# Patient Record
Sex: MALE | Race: WHITE | Employment: FULL TIME | URBAN - METROPOLITAN AREA
[De-identification: names, ages, dates, MRNs, and addresses within clinical notes are randomized per-mention and may not be internally consistent; named-entity substitution may affect disease eponyms.]

---

## 2018-02-05 ENCOUNTER — OFFICE VISIT (OUTPATIENT)
Dept: FAMILY MEDICINE CLINIC | Age: 33
End: 2018-02-05

## 2018-02-05 VITALS
TEMPERATURE: 98.2 F | RESPIRATION RATE: 16 BRPM | HEIGHT: 68 IN | WEIGHT: 188.5 LBS | OXYGEN SATURATION: 98 % | SYSTOLIC BLOOD PRESSURE: 110 MMHG | DIASTOLIC BLOOD PRESSURE: 72 MMHG | BODY MASS INDEX: 28.57 KG/M2 | HEART RATE: 56 BPM

## 2018-02-05 DIAGNOSIS — G47.00 INSOMNIA, UNSPECIFIED TYPE: ICD-10-CM

## 2018-02-05 DIAGNOSIS — M54.41 BILATERAL LOW BACK PAIN WITH BILATERAL SCIATICA, UNSPECIFIED CHRONICITY: ICD-10-CM

## 2018-02-05 DIAGNOSIS — G43.109 MIGRAINE WITH AURA AND WITHOUT STATUS MIGRAINOSUS, NOT INTRACTABLE: Primary | ICD-10-CM

## 2018-02-05 DIAGNOSIS — M54.42 BILATERAL LOW BACK PAIN WITH BILATERAL SCIATICA, UNSPECIFIED CHRONICITY: ICD-10-CM

## 2018-02-05 DIAGNOSIS — M54.2 CERVICAL SPINE PAIN: ICD-10-CM

## 2018-02-05 RX ORDER — TRAZODONE HYDROCHLORIDE 100 MG/1
100 TABLET ORAL
Qty: 30 TAB | Refills: 5 | Status: SHIPPED | OUTPATIENT
Start: 2018-02-05

## 2018-02-05 RX ORDER — PREGABALIN 50 MG/1
50 CAPSULE ORAL 2 TIMES DAILY
Qty: 60 CAP | Refills: 1 | Status: SHIPPED | OUTPATIENT
Start: 2018-02-05

## 2018-02-05 RX ORDER — RIZATRIPTAN BENZOATE 10 MG/1
10 TABLET, ORALLY DISINTEGRATING ORAL
Qty: 12 TAB | Refills: 5 | Status: SHIPPED | OUTPATIENT
Start: 2018-02-05 | End: 2019-11-24 | Stop reason: SDUPTHER

## 2018-02-05 RX ORDER — IBUPROFEN 200 MG
800 TABLET ORAL
COMMUNITY
End: 2018-03-02 | Stop reason: ALTCHOICE
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
## 2018-02-05 NOTE — MR AVS SNAPSHOT
[address] [address] 
[phone] Patient: Vesta [place] MRN: [id number] HAH:[birth year] Visit Information Date & Time Provider Department Dept. Phone Encounter #  
 2/5/2018  2:30 PM Orlin Alvarez NP [facility] [phone] [id number] Upcoming Health Maintenance Date Due DTaP/Tdap/Td series (1 - Tdap) 12/4/2005 Influenza Age 5 to Adult 8/1/2017 Allergies as of 2/5/2018  Review Complete On: 2/5/2018 By: Orlin Alvarez NP Severity Noted Reaction Type Reactions Shellfish Derived High 02/05/2018    Anaphylaxis Tobramycin  02/05/2018    Other (comments) Eye swelling Current Immunizations  Never Reviewed No immunizations on file. Not reviewed this visit You Were Diagnosed With   
  
 Codes Comments Migraine with aura and without status migrainosus, not intractable    -  Primary ICD-10-CM: G43.109 ICD-9-CM: 346.00 Bilateral low back pain with bilateral sciatica, unspecified chronicity     ICD-10-CM: M54.42, M54.41 
ICD-9-CM: 724.3 Cervical spine pain     ICD-10-CM: M54.2 ICD-9-CM: 723.1 Insomnia, unspecified type     ICD-10-CM: G47.00 ICD-9-CM: 780.52 Vitals BP Pulse Temp Resp Height(growth percentile) Weight(growth percentile) 110/72 (!) 56 98.2 °F (36.8 °C) (Oral) 16 5' 8\" (1.727 m) 188 lb 8 oz (85.5 kg) SpO2 BMI Smoking Status 98% 28.66 kg/m2 Former Smoker Vitals History BMI and BSA Data Body Mass Index Body Surface Area  
 28.66 kg/m 2 2.03 m 2 Preferred Pharmacy Pharmacy Name Phone [facility] [address], [address] [phone] Your Updated Medication List  
  
   
This list is accurate as of: 2/5/18  3:36 PM.  Always use your most recent med list. ADVIL 200 mg tablet Generic drug:  ibuprofen Take 800 mg by mouth every six (6) hours as needed for Pain. pregabalin 50 mg capsule Commonly known as:  Chio Sor Take 1 Cap by mouth two (2) times a day. rizatriptan 10 mg disintegrating tablet Commonly known as:  MAXALT-MLT Take 1 Tab by mouth once as needed for Migraine for up to 1 dose. traZODone 100 mg tablet Commonly known as:  Sherrlyn Pali Take 1 Tab by mouth nightly. Prescriptions Printed Refills  
 pregabalin (LYRICA) 50 mg capsule 1 Sig: Take 1 Cap by mouth two (2) times a day. Class: Print Route: Oral  
  
Prescriptions Sent to Pharmacy Refills  
 rizatriptan (MAXALT-MLT) 10 mg disintegrating tablet 5 Sig: Take 1 Tab by mouth once as needed for Migraine for up to 1 dose. Class: Normal  
 Pharmacy: [address], [address] Ph #: [phone] Route: Oral  
 traZODone (DESYREL) 100 mg tablet 5 Sig: Take 1 Tab by mouth nightly. Class: Normal  
 Pharmacy: [address], [address] Ph #: [phone] Route: Oral  
  
We Performed the Following REFERRAL TO PAIN MANAGEMENT [RFI327 Custom] Referral Information Referral ID Referred By Referred To  
  
 [id number] [address] Phone: [phone] Fax: [phone] Visits Status Start Date End Date 1 New Request 2/5/18 2/5/19 If your referral has a status of pending review or denied, additional information will be sent to support the outcome of this decision. Introducing [facility] & HEALTH SERVICES! Glory Torres introduces RoboCV patient portal. Now you can access parts of your medical record, email your doctor's office, and request medication refills online. 1. In your internet browser, go to [email or web address] 2. Click on the First Time User? Click Here link in the Sign In box. You will see the New Member Sign Up page. 3. Enter your Strategy Store Access Code exactly as it appears below. You will not need to use this code after youve completed the sign-up process. If you do not sign up before the expiration date, you must request a new code. · Strategy Store Access Code: [email or web address] Expires: 5/6/2018  2:47 PM 
 
4. Enter the last four digits of your Social Security Number (xxxx) and Date of Birth (mm/dd/yyyy) as indicated and click Submit. You will be taken to the next sign-up page. 5. Create a Strategy Store ID. This will be your Strategy Store login ID and cannot be changed, so think of one that is secure and easy to remember. 6. Create a Strategy Store password. You can change your password at any time. 7. Enter your Password Reset Question and Answer. This can be used at a later time if you forget your password. 8. Enter your e-mail address. You will receive e-mail notification when new information is available in [address] 9. Click Sign Up. You can now view and download portions of your medical record. 10. Click the Download Summary menu link to download a portable copy of your medical information. If you have questions, please visit the Frequently Asked Questions section of the Strategy Store website. Remember, Strategy Store is NOT to be used for urgent needs. For medical emergencies, dial 911. Now available from your iPhone and Android! Please provide this summary of care documentation to your next provider. If you have any questions after today's visit, please call [phone].

## 2018-02-05 NOTE — PROGRESS NOTES
1. Have you been to the ER, urgent care clinic since your last visit? Hospitalized since your last visit? No    2. Have you seen or consulted any other health care providers outside of the [address] [place] [address] since your last visit? Include any pap smears or colon screening. No    Chief Complaint   Patient presents with    Establish Care    Allergies     would like to have allergy testing    Migraine     needs meds    Other     referral to pain management     Pt states he would like allergy testing & a referral to pain management. He also needs refills on migraine meds.

## 2018-02-16 ENCOUNTER — DOCUMENTATION ONLY (OUTPATIENT)
Dept: FAMILY MEDICINE CLINIC | Age: 33
End: 2018-02-16

## 2018-02-16 NOTE — PROGRESS NOTES
Per patient's request I faxed referral requisition to Dr. Robb Turpin [phone]. Confirmation rec'd.

## 2018-02-19 ENCOUNTER — DOCUMENTATION ONLY (OUTPATIENT)
Dept: FAMILY MEDICINE CLINIC | Age: 33
End: 2018-02-19

## 2018-02-19 NOTE — PROGRESS NOTES
Faxed 02/05/18 office note to Dr Leopoldo Manners 's office @[phone].  Fax confirmation received

## 2018-03-02 ENCOUNTER — OFFICE VISIT (OUTPATIENT)
Dept: FAMILY MEDICINE CLINIC | Age: 33
End: 2018-03-02

## 2018-03-02 VITALS
DIASTOLIC BLOOD PRESSURE: 62 MMHG | RESPIRATION RATE: 18 BRPM | WEIGHT: 185 LBS | HEIGHT: 68 IN | OXYGEN SATURATION: 97 % | BODY MASS INDEX: 28.04 KG/M2 | HEART RATE: 87 BPM | TEMPERATURE: 98.3 F | SYSTOLIC BLOOD PRESSURE: 133 MMHG

## 2018-03-02 DIAGNOSIS — M1A.0720 CHRONIC GOUT OF LEFT FOOT, UNSPECIFIED CAUSE: Primary | ICD-10-CM

## 2018-03-02 RX ORDER — INDOMETHACIN 50 MG/1
50 CAPSULE ORAL
Qty: 60 CAP | Refills: 2 | Status: SHIPPED | OUTPATIENT
Start: 2018-03-02 | End: 2018-08-25 | Stop reason: SDUPTHER

## 2018-03-02 RX ORDER — HYDROCODONE BITARTRATE AND ACETAMINOPHEN 5; 325 MG/1; MG/1
TABLET ORAL
COMMUNITY
End: 2018-07-25

## 2018-03-02 RX ORDER — VENLAFAXINE HYDROCHLORIDE 75 MG/1
CAPSULE, EXTENDED RELEASE ORAL DAILY
COMMUNITY
End: 2018-06-26

## 2018-03-02 NOTE — PROGRESS NOTES
Chief Complaint   Patient presents with    Medication Evaluation    Gout     Patient in office today to discuss f/u on medications. Pt has est care with pain management. Pt would like to discuss gout flare ups. Pt states gout flare is not active in the office. 1. Have you been to the ER, urgent care clinic since your last visit? Hospitalized since your last visit? No    2. Have you seen or consulted any other health care providers outside of the [address] [place] [address] since your last visit? Include any pap smears or colon screening.  No

## 2018-03-02 NOTE — PROGRESS NOTES
HISTORY OF PRESENT ILLNESS  Carmen Nowak is a 34 y.o. male. HPI  Patient in office today to discuss f/u on medications. Pt has est care with pain management. They would like to take over his rx for Lyrica, has 2 MRIs set up for the coming weeks on neck and lumbar spine  Pt would like to discuss gout flare ups. Pt states gout flare is not active in the office. Used to have indocin but ran out, affects left foot the most    ROS  A comprehensive review of system was obtained and negative except findings in the HPI    Visit Vitals    /62 (BP 1 Location: Right arm, BP Patient Position: Sitting)    Pulse 87    Temp 98.3 °F (36.8 °C) (Oral)    Resp 18    Ht 5' 8\" (1.727 m)    Wt 185 lb (83.9 kg)    SpO2 97%    BMI 28.13 kg/m2     Physical Exam   Constitutional: He is oriented to person, place, and time. He appears well-developed and well-nourished. No distress. Cardiovascular: Normal rate and regular rhythm. No murmur heard. Pulmonary/Chest: Breath sounds normal. No respiratory distress. He has no wheezes. Musculoskeletal: He exhibits no edema. Neurological: He is alert and oriented to person, place, and time. Nursing note and vitals reviewed. ASSESSMENT and PLAN  Encounter Diagnoses   Name Primary?  Chronic gout of left foot, unspecified cause Yes     Orders Placed This Encounter    venlafaxine-SR (EFFEXOR XR) 75 mg capsule    HYDROcodone-acetaminophen (NORCO) 5-325 mg per tablet    indomethacin (INDOCIN) 50 mg capsule     Med list updated  Given refills of indocin for prn gout pain  Follow up prn  I have discussed the diagnosis with the patient and the intended plan as seen in the above orders. The patient has received an after-visit summary and questions were answered concerning future plans. Patient conveyed understanding of the plan at the time of the visit.     Leigh Ann Zhang, MSN, ANP  3/2/2018

## 2018-03-02 NOTE — MR AVS SNAPSHOT
[address] [address] 
[phone] Patient: Rhett Boggs MRN: [id number] OBQ:[birth year] Visit Information Date & Time Provider Department Dept. Phone Encounter #  
 3/2/2018  3:45 PM Tej Perez NP [facility] [phone] [id number] Upcoming Health Maintenance Date Due DTaP/Tdap/Td series (1 - Tdap) 12/4/2005 Influenza Age 5 to Adult 8/1/2017 Allergies as of 3/2/2018  Review Complete On: 3/2/2018 By: Cecilia James LPN Severity Noted Reaction Type Reactions Shellfish Derived High 02/05/2018    Anaphylaxis Tobramycin  02/05/2018    Other (comments) Eye swelling Current Immunizations  Never Reviewed No immunizations on file. Not reviewed this visit You Were Diagnosed With   
  
 Codes Comments Chronic gout of left foot, unspecified cause    -  Primary ICD-10-CM: U9Y.1859 ICD-9-CM: 274.02 Vitals BP Pulse Temp Resp Height(growth percentile) Weight(growth percentile) 133/62 (BP 1 Location: Right arm, BP Patient Position: Sitting) 87 98.3 °F (36.8 °C) (Oral) 18 5' 8\" (1.727 m) 185 lb (83.9 kg) SpO2 BMI Smoking Status 97% 28.13 kg/m2 Former Smoker Vitals History BMI and BSA Data Body Mass Index Body Surface Area  
 28.13 kg/m 2 2.01 m 2 Preferred Pharmacy Pharmacy Name Phone Clifford Jeffries [address] , [address]  [phone] Your Updated Medication List  
  
   
This list is accurate as of 3/2/18  4:33 PM.  Always use your most recent med list.  
  
  
  
  
 EFFEXOR XR 75 mg capsule Generic drug:  venlafaxine-SR Take  by mouth daily. HYDROcodone-acetaminophen 5-325 mg per tablet Commonly known as:  Kristen Chain Take  by mouth. indomethacin 50 mg capsule Commonly known as:  INDOCIN Take 1 Cap by mouth three (3) times daily as needed. pregabalin 50 mg capsule Commonly known as:  Angel Luis Menarder Take 1 Cap by mouth two (2) times a day. rizatriptan 10 mg disintegrating tablet Commonly known as:  MAXALT-MLT Take 1 Tab by mouth once as needed for Migraine for up to 1 dose. traZODone 100 mg tablet Commonly known as:  Elmer Pressley Take 1 Tab by mouth nightly. Prescriptions Sent to Pharmacy Refills  
 indomethacin (INDOCIN) 50 mg capsule 2 Sig: Take 1 Cap by mouth three (3) times daily as needed. Class: Normal  
 Pharmacy: [facility]hon Hicks [address] [place] [address], [address] [facility] #: [phone] Route: Oral  
  
To-Do List   
 03/09/2018 3:45 PM  
  Appointment with [facility] MRI 1 at [address] ([phone]) 1. Please bring a list or a bag of your current medications to your appointment 2. Please be sure to remove ALL hair clips, pins, extensions, etc., prior to arriving for your MRI procedure. 3. If you have any medical implants or devices, please bring associated medical card with you. 4. Bring any non [facility] films or CDs pertaining to the area being imaged with you on the day of appointment. 5. A written order with a valid diagnosis and Physicians  signature is required for all scheduled tests. 6. Check in at registration 30min before your appointment time unless you were instructed to do otherwise. 03/09/2018 4:30 PM  
  Appointment with [facility] MRI 1 at [address] ([phone]) 1. Please bring a list or a bag of your current medications to your appointment 2. Please be sure to remove ALL hair clips, pins, extensions, etc., prior to arriving for your MRI procedure. 3. If you have any medical implants or devices, please bring associated medical card with you. 4. Bring any non [facility] films or CDs pertaining to the area being imaged with you on the day of appointment.  5. A written order with a valid diagnosis and Physicians  signature is required for all scheduled tests. 6. Check in at registration 30min before your appointment time unless you were instructed to do otherwise. Introducing [facility] & HEALTH SERVICES! [employer] introduces Goods Platform patient portal. Now you can access parts of your medical record, email your doctor's office, and request medication refills online. 1. In your internet browser, go to [email or web address] 2. Click on the First Time User? Click Here link in the Sign In box. You will see the New Member Sign Up page. 3. Enter your Goods Platform Access Code exactly as it appears below. You will not need to use this code after youve completed the sign-up process. If you do not sign up before the expiration date, you must request a new code. · Goods Platform Access Code: [email or web address] Expires: 5/6/2018  2:47 PM 
 
4. Enter the last four digits of your Social Security Number (xxxx) and Date of Birth (mm/dd/yyyy) as indicated and click Submit. You will be taken to the next sign-up page. 5. Create a Goods Platform ID. This will be your Goods Platform login ID and cannot be changed, so think of one that is secure and easy to remember. 6. Create a Goods Platform password. You can change your password at any time. 7. Enter your Password Reset Question and Answer. This can be used at a later time if you forget your password. 8. Enter your e-mail address. You will receive e-mail notification when new information is available in [address] 9. Click Sign Up. You can now view and download portions of your medical record. 10. Click the Download Summary menu link to download a portable copy of your medical information. If you have questions, please visit the Frequently Asked Questions section of the Goods Platform website. Remember, Goods Platform is NOT to be used for urgent needs. For medical emergencies, dial 911. Now available from your iPhone and Android! Please provide this summary of care documentation to your next provider. If you have any questions after today's visit, please call [phone].

## 2018-04-20 ENCOUNTER — OFFICE VISIT (OUTPATIENT)
Dept: FAMILY MEDICINE CLINIC | Age: 33
End: 2018-04-20

## 2018-04-20 VITALS
OXYGEN SATURATION: 98 % | BODY MASS INDEX: 28.82 KG/M2 | SYSTOLIC BLOOD PRESSURE: 112 MMHG | DIASTOLIC BLOOD PRESSURE: 78 MMHG | TEMPERATURE: 97.9 F | WEIGHT: 190.2 LBS | RESPIRATION RATE: 19 BRPM | HEART RATE: 73 BPM | HEIGHT: 68 IN

## 2018-04-20 DIAGNOSIS — M25.50 PAIN, JOINT, MULTIPLE SITES: Primary | ICD-10-CM

## 2018-04-20 RX ORDER — CYCLOBENZAPRINE HCL 5 MG
5 TABLET ORAL
COMMUNITY

## 2018-04-20 RX ORDER — SERTRALINE HYDROCHLORIDE 50 MG/1
TABLET, FILM COATED ORAL DAILY
COMMUNITY
End: 2018-06-26

## 2018-04-20 NOTE — MR AVS SNAPSHOT
[address] [address] 
[phone] Patient: Nicole Ojeda MRN: [id number] EKS:24[birth year] Visit Information Date & Time Provider Department Dept. Phone Encounter #  
 4/20/2018  2:15 PM Ronit Marcial, Cassy Arango [phone] [id number] Upcoming Health Maintenance Date Due DTaP/Tdap/Td series (1 - Tdap) 12/4/2005 Influenza Age 5 to Adult 8/1/2017 Allergies as of 4/20/2018  Review Complete On: 4/20/2018 By: Trevor Alvarez LPN Severity Noted Reaction Type Reactions Shellfish Derived High 02/05/2018    Anaphylaxis Tobramycin  02/05/2018    Other (comments) Eye swelling Current Immunizations  Never Reviewed No immunizations on file. Not reviewed this visit You Were Diagnosed With   
  
 Codes Comments Pain, joint, multiple sites    -  Primary ICD-10-CM: M25.50 ICD-9-CM: 719.49 Vitals BP Pulse Temp Resp Height(growth percentile) Weight(growth percentile) 112/78 (BP 1 Location: Left arm, BP Patient Position: Sitting) 73 97.9 °F (36.6 °C) (Oral) 19 5' 8\" (1.727 m) 190 lb 3.2 oz (86.3 kg) SpO2 BMI Smoking Status 98% 28.92 kg/m2 Former Smoker Vitals History BMI and BSA Data Body Mass Index Body Surface Area  
 28.92 kg/m 2 2.03 m 2 Preferred Pharmacy Pharmacy Name Phone Skyla [facility] [address] , [address] [facility] [phone] Your Updated Medication List  
  
   
This list is accurate as of 4/20/18  3:14 PM.  Always use your most recent med list.  
  
  
  
  
 cyclobenzaprine 5 mg tablet Commonly known as:  FLEXERIL Take 5 mg by mouth. EFFEXOR XR 75 mg capsule Generic drug:  venlafaxine-SR Take  by mouth daily. HYDROcodone-acetaminophen 5-325 mg per tablet Commonly known as:  Maria Luisa Bar Take  by mouth. indomethacin 50 mg capsule Commonly known as:  INDOCIN Take 1 Cap by mouth three (3) times daily as needed. pregabalin 50 mg capsule Commonly known as:  Davidson Genin Take 1 Cap by mouth two (2) times a day. rizatriptan 10 mg disintegrating tablet Commonly known as:  MAXALT-MLT Take 1 Tab by mouth once as needed for Migraine for up to 1 dose. sertraline 50 mg tablet Commonly known as:  ZOLOFT Take  by mouth daily. traZODone 100 mg tablet Commonly known as:  [place] Dart Take 1 Tab by mouth nightly. We Performed the Following SADE+RA QN [[id number] Custom] SED RATE (ESR) [id number] CPT(R)] Patient Instructions When labs are avail send to: [facility] Fax: [phone] Introducing [facility] & HEALTH SERVICES! Deena Velez introduces Insider Pages patient portal. Now you can access parts of your medical record, email your doctor's office, and request medication refills online. 1. In your internet browser, go to [email or web address] 2. Click on the First Time User? Click Here link in the Sign In box. You will see the New Member Sign Up page. 3. Enter your Insider Pages Access Code exactly as it appears below. You will not need to use this code after youve completed the sign-up process. If you do not sign up before the expiration date, you must request a new code. · Insider Pages Access Code: [email or web address] Expires: 5/6/2018  3:47 PM 
 
4. Enter the last four digits of your Social Security Number (xxxx) and Date of Birth (mm/dd/yyyy) as indicated and click Submit. You will be taken to the next sign-up page. 5. Create a Insider Pages ID. This will be your Insider Pages login ID and cannot be changed, so think of one that is secure and easy to remember. 6. Create a Insider Pages password. You can change your password at any time. 7. Enter your Password Reset Question and Answer. This can be used at a later time if you forget your password. 8. Enter your e-mail address. You will receive e-mail notification when new information is available in [address] 9. Click Sign Up. You can now view and download portions of your medical record. 10. Click the Download Summary menu link to download a portable copy of your medical information. If you have questions, please visit the Frequently Asked Questions section of the Access Closure website. Remember, Access Closure is NOT to be used for urgent needs. For medical emergencies, dial 911. Now available from your iPhone and Android! Please provide this summary of care documentation to your next provider. If you have any questions after today's visit, please call [phone].

## 2018-04-20 NOTE — PROGRESS NOTES
Chief Complaint   Patient presents with    Labs     Pt seen in the office today for lab for \"arthritis testing\"

## 2018-04-21 LAB
ANA SER QL: NEGATIVE
ERYTHROCYTE [SEDIMENTATION RATE] IN BLOOD BY WESTERGREN METHOD: 2 MM/HR (ref 0–15)
RHEUMATOID FACT SERPL-ACNC: <10 IU/ML (ref 0–13.9)
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
## 2018-05-08 ENCOUNTER — TELEPHONE (OUTPATIENT)
Dept: FAMILY MEDICINE CLINIC | Age: 33
End: 2018-05-08

## 2018-05-08 RX ORDER — COLCHICINE 0.6 MG/1
0.6 CAPSULE ORAL 2 TIMES DAILY
Qty: 60 CAP | Refills: 1 | Status: SHIPPED | OUTPATIENT
Start: 2018-05-08 | End: 2019-07-03 | Stop reason: SDUPTHER

## 2018-05-08 NOTE — TELEPHONE ENCOUNTER
Pt's spouse (Dorita) calling back. Patient already has indocin: not working. Foot is so swollen and sore that he is unable to stand. Requesting additional med or suggestion.   Best callback# for [employer] C:[phone]

## 2018-05-25 ENCOUNTER — DOCUMENTATION ONLY (OUTPATIENT)
Dept: FAMILY MEDICINE CLINIC | Age: 33
End: 2018-05-25

## 2018-05-25 NOTE — PROGRESS NOTES
Patient brought in medical records from the [employer]. It's in the bin up front. Also, he has a DMV Form attached to be completed.  Please call patient @ Phone [phone]

## 2018-06-26 RX ORDER — GLUCOSAMINE/CHONDR SU A SOD 750-600 MG
TABLET ORAL DAILY
COMMUNITY

## 2018-06-26 NOTE — PERIOP NOTES
1979 [facility] [address], [address]    MAIN OR [phone]    MAIN PRE OP [phone]    AMBULATORY PRE OP [phone]    PRE-ADMISSION TESTING [phone]       Surgery Date:   07/25/2018        Is surgery arrival time given by surgeon? NO  If NO, [facility] staff will call you between 3 and 7pm the day before your surgery with your arrival time. (If your surgery is on a Monday, we will call you the Friday before.)    Call [phone] after 7pm Monday-Friday if you did not receive your arrival time.     Answers to Common Questions   When You  Arrive   Arrive at the [facility] [address] [facility] on the day of your surgery  Have your insurance card, photo ID, and any copayment (if needed)     Food   and   Drink   NO food or drink after midnight the night before surgery    This means NO water, gum, mints, coffee, juice, etc.  No alcohol (beer, wine, liquor) 24 hours before and after surgery     Medicine to   TAKE   Morning of Surgery   MEDICATIONS TO TAKE THE MORNING OF SURGERY WITH A SIP OF WATER:    Flexeril, Norco, Indocin, Lyrica     Medicine  To  STOP   FOR PAIN   NO Aspirin for pain    NO Non-Steroidal Anti-Inflammatory Drugs (NSAIDs:   for example, Ibuprofen (Advil, Motrin), Naproxen (Aleve)   STOP herbal supplements and vitamins 1 week before surgery   You can take Tylenol - follow instructions on the bottle     Blood  Thinners    If you take Aspirin, Plavix, Coumadin, blood-thinning or anti-clot medicine, talk to your surgeon and/or follow the instructions from the doctor who told you to take that medicine     Clothing  Jewelry  Valuables  Bathing     CLOTHING   Wear loose, comfortable clothes   Wear glasses instead of contacts   Leave money, jewelry and valuables at home   No make-up, particularly mascara, the day of surgery   REMOVE ALL piercings, rings, and jewelry - leave at home   Wear hair loose or down; no pony-tails, buns, or metal hair clips    BATHING   Follow all special bath instructions (for total joint replacement, spine and bowel surgeries.)   If you shower the morning of surgery, please do not apply any lotions, powders, or deodorants afterwards. Do not shave or trim anywhere 24 hours before surgery. Going Home  or  Spending the Night    SAME-DAY SURGERY: You must have a responsible adult drive you home and stay with you 24 hours after surgery   ADMITS: If your doctor is keeping you into the hospital after surgery, leave personal belongings/luggage in your car until you have a hospital room number. Hospital discharge time is 12 noon  Drivers must be here before 12 noon unless you are told differently         Follow all instructions so your surgery wont be cancelled. Please, be on time. If a situation occurs and you are delayed the day of surgery, call [phone]     If your physical condition changes (like a fever, cold, flu, etc.) call your surgeon as soon as possible. The [facility] staff can be reached at [phone]. OTHER SPECIAL INSTRUCTIONS:  Free  Parking    The patient was contacted  via phone. He  verbalize  understanding of all instructions and does not  need reinforcement.

## 2018-07-24 ENCOUNTER — ANESTHESIA EVENT (OUTPATIENT)
Dept: SURGERY | Age: 33
End: 2018-07-24
Payer: COMMERCIAL

## 2018-07-25 ENCOUNTER — ANESTHESIA (OUTPATIENT)
Dept: SURGERY | Age: 33
End: 2018-07-25
Payer: COMMERCIAL

## 2018-07-25 ENCOUNTER — HOSPITAL ENCOUNTER (OUTPATIENT)
Age: 33
Setting detail: OUTPATIENT SURGERY
Discharge: HOME | End: 2018-07-25
Attending: OTOLARYNGOLOGY | Admitting: OTOLARYNGOLOGY
Payer: COMMERCIAL

## 2018-07-25 VITALS
DIASTOLIC BLOOD PRESSURE: 58 MMHG | SYSTOLIC BLOOD PRESSURE: 143 MMHG | OXYGEN SATURATION: 99 % | HEART RATE: 82 BPM | RESPIRATION RATE: 17 BRPM | WEIGHT: 185 LBS | TEMPERATURE: 97 F | HEIGHT: 72 IN | BODY MASS INDEX: 25.06 KG/M2

## 2018-07-25 DIAGNOSIS — J34.2 NASAL SEPTAL DEVIATION: Primary | ICD-10-CM

## 2018-07-25 PROCEDURE — 77030002966 HC SUT PDS J&J -A: Performed by: OTOLARYNGOLOGY

## 2018-07-25 PROCEDURE — 77030026438 HC STYL ET INTUB CARD -A: Performed by: NURSE ANESTHETIST, CERTIFIED REGISTERED

## 2018-07-25 PROCEDURE — 76210000016 HC OR PH I REC 1 TO 1.5 HR: Performed by: OTOLARYNGOLOGY

## 2018-07-25 PROCEDURE — 77030021668 HC NEB PREFIL KT VYRM -A

## 2018-07-25 PROCEDURE — 74011250636 HC RX REV CODE- 250/636: Performed by: ANESTHESIOLOGY

## 2018-07-25 PROCEDURE — 77030019908 HC STETH ESOPH SIMS -A: Performed by: NURSE ANESTHETIST, CERTIFIED REGISTERED

## 2018-07-25 PROCEDURE — 74011000272 HC RX REV CODE- 272: Performed by: OTOLARYNGOLOGY

## 2018-07-25 PROCEDURE — 77030006907 HC BLD SNUS SHV MEDT -C: Performed by: OTOLARYNGOLOGY

## 2018-07-25 PROCEDURE — 76210000020 HC REC RM PH II FIRST 0.5 HR: Performed by: OTOLARYNGOLOGY

## 2018-07-25 PROCEDURE — 74011250636 HC RX REV CODE- 250/636

## 2018-07-25 PROCEDURE — 77030011645 HC PK NSL DOYLE MEDT -B: Performed by: OTOLARYNGOLOGY

## 2018-07-25 PROCEDURE — 77030008684 HC TU ET CUF COVD -B: Performed by: NURSE ANESTHETIST, CERTIFIED REGISTERED

## 2018-07-25 PROCEDURE — 74011250636 HC RX REV CODE- 250/636: Performed by: OTOLARYNGOLOGY

## 2018-07-25 PROCEDURE — 77030020782 HC GWN BAIR PAWS FLX 3M -B

## 2018-07-25 PROCEDURE — 77030002974 HC SUT PLN J&J -A: Performed by: OTOLARYNGOLOGY

## 2018-07-25 PROCEDURE — 77030018836 HC SOL IRR NACL ICUM -A: Performed by: OTOLARYNGOLOGY

## 2018-07-25 PROCEDURE — 76010000132 HC OR TIME 2.5 TO 3 HR: Performed by: OTOLARYNGOLOGY

## 2018-07-25 PROCEDURE — 74011250637 HC RX REV CODE- 250/637: Performed by: OTOLARYNGOLOGY

## 2018-07-25 PROCEDURE — 74011000250 HC RX REV CODE- 250: Performed by: OTOLARYNGOLOGY

## 2018-07-25 PROCEDURE — 77030008771 HC TU NG SALEM SUMP -A: Performed by: NURSE ANESTHETIST, CERTIFIED REGISTERED

## 2018-07-25 PROCEDURE — 74011000250 HC RX REV CODE- 250

## 2018-07-25 PROCEDURE — 77030020256 HC SOL INJ NACL 0.9%  500ML: Performed by: OTOLARYNGOLOGY

## 2018-07-25 PROCEDURE — 77030032490 HC SLV COMPR SCD KNE COVD -B: Performed by: OTOLARYNGOLOGY

## 2018-07-25 PROCEDURE — 76060000036 HC ANESTHESIA 2.5 TO 3 HR: Performed by: OTOLARYNGOLOGY

## 2018-07-25 PROCEDURE — 77030002986 HC SUT PROL J&J -A: Performed by: OTOLARYNGOLOGY

## 2018-07-25 RX ORDER — ONDANSETRON 2 MG/ML
INJECTION INTRAMUSCULAR; INTRAVENOUS AS NEEDED
Status: DISCONTINUED | OUTPATIENT
Start: 2018-07-25 | End: 2018-07-25 | Stop reason: HOSPADM

## 2018-07-25 RX ORDER — DEXAMETHASONE SODIUM PHOSPHATE 4 MG/ML
INJECTION, SOLUTION INTRA-ARTICULAR; INTRALESIONAL; INTRAMUSCULAR; INTRAVENOUS; SOFT TISSUE AS NEEDED
Status: DISCONTINUED | OUTPATIENT
Start: 2018-07-25 | End: 2018-07-25 | Stop reason: HOSPADM

## 2018-07-25 RX ORDER — FLUMAZENIL 0.1 MG/ML
0.2 INJECTION INTRAVENOUS
Status: DISCONTINUED | OUTPATIENT
Start: 2018-07-25 | End: 2018-07-25 | Stop reason: HOSPADM

## 2018-07-25 RX ORDER — SODIUM CHLORIDE 0.9 % (FLUSH) 0.9 %
5-10 SYRINGE (ML) INJECTION AS NEEDED
Status: DISCONTINUED | OUTPATIENT
Start: 2018-07-25 | End: 2018-07-25 | Stop reason: HOSPADM

## 2018-07-25 RX ORDER — LIDOCAINE HYDROCHLORIDE 20 MG/ML
INJECTION, SOLUTION EPIDURAL; INFILTRATION; INTRACAUDAL; PERINEURAL AS NEEDED
Status: DISCONTINUED | OUTPATIENT
Start: 2018-07-25 | End: 2018-07-25 | Stop reason: HOSPADM

## 2018-07-25 RX ORDER — HYDROCODONE BITARTRATE AND ACETAMINOPHEN 5; 325 MG/1; MG/1
1-2 TABLET ORAL
Qty: 30 TAB | Refills: 0 | Status: SHIPPED | OUTPATIENT
Start: 2018-07-25 | End: 2018-09-12 | Stop reason: ALTCHOICE

## 2018-07-25 RX ORDER — FENTANYL CITRATE 50 UG/ML
INJECTION, SOLUTION INTRAMUSCULAR; INTRAVENOUS AS NEEDED
Status: DISCONTINUED | OUTPATIENT
Start: 2018-07-25 | End: 2018-07-25 | Stop reason: HOSPADM

## 2018-07-25 RX ORDER — FAMOTIDINE 10 MG/ML
INJECTION INTRAVENOUS AS NEEDED
Status: DISCONTINUED | OUTPATIENT
Start: 2018-07-25 | End: 2018-07-25 | Stop reason: HOSPADM

## 2018-07-25 RX ORDER — SODIUM CHLORIDE, SODIUM LACTATE, POTASSIUM CHLORIDE, CALCIUM CHLORIDE 600; 310; 30; 20 MG/100ML; MG/100ML; MG/100ML; MG/100ML
125 INJECTION, SOLUTION INTRAVENOUS CONTINUOUS
Status: DISCONTINUED | OUTPATIENT
Start: 2018-07-25 | End: 2018-07-25 | Stop reason: HOSPADM

## 2018-07-25 RX ORDER — NALOXONE HYDROCHLORIDE 0.4 MG/ML
0.04 INJECTION, SOLUTION INTRAMUSCULAR; INTRAVENOUS; SUBCUTANEOUS
Status: DISCONTINUED | OUTPATIENT
Start: 2018-07-25 | End: 2018-07-25 | Stop reason: HOSPADM

## 2018-07-25 RX ORDER — BACITRACIN ZINC 500 UNIT/G
OINTMENT (GRAM) TOPICAL AS NEEDED
Status: DISCONTINUED | OUTPATIENT
Start: 2018-07-25 | End: 2018-07-25 | Stop reason: HOSPADM

## 2018-07-25 RX ORDER — SODIUM CHLORIDE 0.9 % (FLUSH) 0.9 %
5-10 SYRINGE (ML) INJECTION EVERY 8 HOURS
Status: DISCONTINUED | OUTPATIENT
Start: 2018-07-25 | End: 2018-07-25 | Stop reason: HOSPADM

## 2018-07-25 RX ORDER — CEFDINIR 300 MG/1
600 CAPSULE ORAL DAILY
Qty: 14 CAP | Refills: 0 | Status: SHIPPED | OUTPATIENT
Start: 2018-07-25 | End: 2018-08-01

## 2018-07-25 RX ORDER — LIDOCAINE HYDROCHLORIDE AND EPINEPHRINE 10; 10 MG/ML; UG/ML
INJECTION, SOLUTION INFILTRATION; PERINEURAL AS NEEDED
Status: DISCONTINUED | OUTPATIENT
Start: 2018-07-25 | End: 2018-07-25 | Stop reason: HOSPADM

## 2018-07-25 RX ORDER — CEFAZOLIN SODIUM/WATER 2 G/20 ML
2 SYRINGE (ML) INTRAVENOUS ONCE
Status: COMPLETED | OUTPATIENT
Start: 2018-07-25 | End: 2018-07-25

## 2018-07-25 RX ORDER — OXYMETAZOLINE HCL 0.05 %
SPRAY, NON-AEROSOL (ML) NASAL AS NEEDED
Status: DISCONTINUED | OUTPATIENT
Start: 2018-07-25 | End: 2018-07-25 | Stop reason: HOSPADM

## 2018-07-25 RX ORDER — SUCCINYLCHOLINE CHLORIDE 20 MG/ML
INJECTION INTRAMUSCULAR; INTRAVENOUS AS NEEDED
Status: DISCONTINUED | OUTPATIENT
Start: 2018-07-25 | End: 2018-07-25 | Stop reason: HOSPADM

## 2018-07-25 RX ORDER — ROCURONIUM BROMIDE 10 MG/ML
INJECTION, SOLUTION INTRAVENOUS AS NEEDED
Status: DISCONTINUED | OUTPATIENT
Start: 2018-07-25 | End: 2018-07-25 | Stop reason: HOSPADM

## 2018-07-25 RX ORDER — DIPHENHYDRAMINE HYDROCHLORIDE 50 MG/ML
12.5 INJECTION, SOLUTION INTRAMUSCULAR; INTRAVENOUS AS NEEDED
Status: DISCONTINUED | OUTPATIENT
Start: 2018-07-25 | End: 2018-07-25 | Stop reason: HOSPADM

## 2018-07-25 RX ORDER — BACITRACIN ZINC 500 UNIT/G
OINTMENT (GRAM) TOPICAL
Qty: 15 G | Refills: 0 | Status: SHIPPED | OUTPATIENT
Start: 2018-07-25 | End: 2018-09-12 | Stop reason: ALTCHOICE

## 2018-07-25 RX ORDER — ONDANSETRON 4 MG/1
4 TABLET, ORALLY DISINTEGRATING ORAL
Qty: 20 TAB | Refills: 2 | Status: SHIPPED | OUTPATIENT
Start: 2018-07-25

## 2018-07-25 RX ORDER — HYDROMORPHONE HYDROCHLORIDE 2 MG/ML
INJECTION, SOLUTION INTRAMUSCULAR; INTRAVENOUS; SUBCUTANEOUS AS NEEDED
Status: DISCONTINUED | OUTPATIENT
Start: 2018-07-25 | End: 2018-07-25 | Stop reason: HOSPADM

## 2018-07-25 RX ORDER — MIDAZOLAM HYDROCHLORIDE 1 MG/ML
INJECTION, SOLUTION INTRAMUSCULAR; INTRAVENOUS AS NEEDED
Status: DISCONTINUED | OUTPATIENT
Start: 2018-07-25 | End: 2018-07-25 | Stop reason: HOSPADM

## 2018-07-25 RX ORDER — PROPOFOL 10 MG/ML
INJECTION, EMULSION INTRAVENOUS AS NEEDED
Status: DISCONTINUED | OUTPATIENT
Start: 2018-07-25 | End: 2018-07-25 | Stop reason: HOSPADM

## 2018-07-25 RX ORDER — DEXAMETHASONE SODIUM PHOSPHATE 4 MG/ML
10 INJECTION, SOLUTION INTRA-ARTICULAR; INTRALESIONAL; INTRAMUSCULAR; INTRAVENOUS; SOFT TISSUE ONCE
Status: DISCONTINUED | OUTPATIENT
Start: 2018-07-25 | End: 2018-07-25 | Stop reason: HOSPADM

## 2018-07-25 RX ORDER — LIDOCAINE HYDROCHLORIDE 10 MG/ML
0.1 INJECTION, SOLUTION EPIDURAL; INFILTRATION; INTRACAUDAL; PERINEURAL AS NEEDED
Status: DISCONTINUED | OUTPATIENT
Start: 2018-07-25 | End: 2018-07-25 | Stop reason: HOSPADM

## 2018-07-25 RX ORDER — OXYMETAZOLINE HCL 0.05 %
2 SPRAY, NON-AEROSOL (ML) NASAL ONCE
Status: COMPLETED | OUTPATIENT
Start: 2018-07-25 | End: 2018-07-25

## 2018-07-25 RX ORDER — HYDROMORPHONE HYDROCHLORIDE 2 MG/ML
.25-1 INJECTION, SOLUTION INTRAMUSCULAR; INTRAVENOUS; SUBCUTANEOUS
Status: DISCONTINUED | OUTPATIENT
Start: 2018-07-25 | End: 2018-07-25 | Stop reason: HOSPADM

## 2018-07-25 RX ADMIN — SODIUM CHLORIDE, POTASSIUM CHLORIDE, SODIUM LACTATE AND CALCIUM CHLORIDE: 600; 310; 30; 20 INJECTION, SOLUTION INTRAVENOUS at 14:20

## 2018-07-25 RX ADMIN — LIDOCAINE HYDROCHLORIDE 60 MG: 20 INJECTION, SOLUTION EPIDURAL; INFILTRATION; INTRACAUDAL; PERINEURAL at 13:48

## 2018-07-25 RX ADMIN — SODIUM CHLORIDE, POTASSIUM CHLORIDE, SODIUM LACTATE AND CALCIUM CHLORIDE: 600; 310; 30; 20 INJECTION, SOLUTION INTRAVENOUS at 13:10

## 2018-07-25 RX ADMIN — SODIUM CHLORIDE, SODIUM LACTATE, POTASSIUM CHLORIDE, AND CALCIUM CHLORIDE 1000 ML: 600; 310; 30; 20 INJECTION, SOLUTION INTRAVENOUS at 10:51

## 2018-07-25 RX ADMIN — FENTANYL CITRATE 150 MCG: 50 INJECTION, SOLUTION INTRAMUSCULAR; INTRAVENOUS at 14:15

## 2018-07-25 RX ADMIN — OXYMETAZOLINE HYDROCHLORIDE 2 SPRAY: 5 SPRAY NASAL at 13:35

## 2018-07-25 RX ADMIN — Medication 2 G: at 14:11

## 2018-07-25 RX ADMIN — DEXAMETHASONE SODIUM PHOSPHATE 4 MG: 4 INJECTION, SOLUTION INTRA-ARTICULAR; INTRALESIONAL; INTRAMUSCULAR; INTRAVENOUS; SOFT TISSUE at 14:05

## 2018-07-25 RX ADMIN — FENTANYL CITRATE 100 MCG: 50 INJECTION, SOLUTION INTRAMUSCULAR; INTRAVENOUS at 13:48

## 2018-07-25 RX ADMIN — ONDANSETRON 4 MG: 2 INJECTION INTRAMUSCULAR; INTRAVENOUS at 14:05

## 2018-07-25 RX ADMIN — ROCURONIUM BROMIDE 10 MG: 10 INJECTION, SOLUTION INTRAVENOUS at 13:48

## 2018-07-25 RX ADMIN — FAMOTIDINE 20 MG: 10 INJECTION INTRAVENOUS at 14:05

## 2018-07-25 RX ADMIN — HYDROMORPHONE HYDROCHLORIDE 1 MG: 2 INJECTION, SOLUTION INTRAMUSCULAR; INTRAVENOUS; SUBCUTANEOUS at 15:39

## 2018-07-25 RX ADMIN — MIDAZOLAM HYDROCHLORIDE 2 MG: 1 INJECTION, SOLUTION INTRAMUSCULAR; INTRAVENOUS at 13:45

## 2018-07-25 RX ADMIN — SUCCINYLCHOLINE CHLORIDE 100 MG: 20 INJECTION INTRAMUSCULAR; INTRAVENOUS at 13:48

## 2018-07-25 RX ADMIN — OXYMETAZOLINE HYDROCHLORIDE 2 SPRAY: 5 SPRAY NASAL at 13:12

## 2018-07-25 RX ADMIN — MIDAZOLAM HYDROCHLORIDE 3 MG: 1 INJECTION, SOLUTION INTRAMUSCULAR; INTRAVENOUS at 13:47

## 2018-07-25 RX ADMIN — PROPOFOL 160 MG: 10 INJECTION, EMULSION INTRAVENOUS at 13:48

## 2018-07-25 NOTE — H&P
The history and physical was reviewed by me and updated today. There are no changes from the previous history and physical (note addition below). This file should be an external document in the notes section or could be in the media portion of the chart.       Chest CTA  Heart RRR    Obdulio Tolbert MD

## 2018-07-25 NOTE — ANESTHESIA POSTPROCEDURE EVALUATION
Post-Anesthesia Evaluation and Assessment    Patient: Chelsey Altamirano MRN: [id number]  SSN: [id number]    YOB: 1985  Age: 34 y.o. Sex: male       Cardiovascular Function/Vital Signs  Visit Vitals    /58    Pulse 82    Temp 36.1 °C (97 °F)    Resp 17    Ht 6' (1.829 m)    Wt 83.9 kg (185 lb)    SpO2 99%    BMI 25.09 kg/m2       Patient is status post general anesthesia for Procedure(s): RHINOPLASTY WITH MAJOR SEPTAL REPAIR, TURBINATE REDUCTION CARTILAGE GRAFT SYSTEM. Nausea/Vomiting: None    Postoperative hydration reviewed and adequate. Pain:  Pain Scale 1: Numeric (0 - 10) (07/25/18 1715)  Pain Intensity 1: 0 (07/25/18 1715)   Managed    Neurological Status:   Neuro (WDL): Exceptions to WDL (07/25/18 1626)  Neuro  Neurologic State: Anesthetized (07/25/18 1626)   At baseline    Mental Status and Level of Consciousness: Arousable    Pulmonary Status:   O2 Device: Room air (07/25/18 1725)   Adequate oxygenation and airway patent    Complications related to anesthesia: None    Post-anesthesia assessment completed.  No concerns    Signed By: Mica Leach MD     July 25, 2018
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
## 2018-07-25 NOTE — IP AVS SNAPSHOT
[address] [facility] [address] 
 
 
 [address] [facility] 
[phone] Patient: Breann Stanford MRN: [id number] ROQ:[id number] About your hospitalization You were admitted on:  July 25, 2018 You last received care in the:  [facility] PACU You were discharged on:  July 25, 2018 Why you were hospitalized Your primary diagnosis was:  Not on File Follow-up Information Follow up With Details Comments Contact Info Kathi Wood MD   Patient can only remember the practice name and not the physician Isaias Trevizo MD Schedule an appointment as soon as possible for a visit in 11 days  [address] [place] [address] [address] [facility] [address] 
[phone] Discharge Orders None A check kaleigh indicates which time of day the medication should be taken. My Medications START taking these medications Instructions Each Dose to Equal  
 Morning Noon Evening Bedtime  
 bacitracin zinc ointment Commonly known as:  BACITRACIN Your last dose was: Your next dose is:    
   
   
 Apply inside nostrils as directed 4x/day  
     
   
   
   
  
 cefdinir 300 mg capsule Commonly known as:  OMNICEF Your last dose was: Your next dose is: Take 2 Caps by mouth daily for 7 days. 600 mg  
    
   
   
   
  
 ondansetron 4 mg disintegrating tablet Commonly known as:  ZOFRAN ODT Your last dose was: Your next dose is: Take 1 Tab by mouth every four (4) hours as needed for Nausea. 4 mg  
    
   
   
   
  
 sodium chloride 0.9 % Spra Commonly known as:  STERILE SALINE Your last dose was: Your next dose is: 2 Sprays by Nasal route every two (2) hours (while awake). To start the night of surgery 2 Spencer CHANGE how you take these medications Instructions Each Dose to Equal  
 Morning Noon Evening Bedtime HYDROcodone-acetaminophen 5-325 mg per tablet Commonly known as:  Spike Eason What changed:   
- how much to take - when to take this 
- reasons to take this Your last dose was: Your next dose is: Take 1-2 Tabs by mouth every four (4) hours as needed for Pain. Max Daily Amount: 12 Tabs. 1-2 Tab  
    
   
   
   
  
 indomethacin 50 mg capsule Commonly known as:  INDOCIN What changed:  when to take this Your last dose was: Your next dose is: Take 1 Cap by mouth three (3) times daily as needed. 50 mg CONTINUE taking these medications Instructions Each Dose to Equal  
 Morning Noon Evening Bedtime Biotin 2,500 mcg Cap Your last dose was: Your next dose is: Take  by mouth daily. colchicine 0.6 mg capsule Commonly known as:  Corey Hartmann Your last dose was: Your next dose is: Take 1 Cap by mouth two (2) times a day. As needed for gout symptoms. 0.6 mg  
    
   
   
   
  
 cyclobenzaprine 5 mg tablet Commonly known as:  FLEXERIL Your last dose was: Your next dose is: Take 5 mg by mouth. 5 mg HAIR,SKIN AND NAILS PO Your last dose was: Your next dose is: Take  by mouth daily. pregabalin 50 mg capsule Commonly known as:  Margarie Domenico Your last dose was: Your next dose is: Take 1 Cap by mouth two (2) times a day. 50 mg  
    
   
   
   
  
 rizatriptan 10 mg disintegrating tablet Commonly known as:  MAXALT-MLT Your last dose was: Your next dose is: Take 1 Tab by mouth once as needed for Migraine for up to 1 dose. 10 mg  
    
   
   
   
  
 TART CHERRY EXTRACT PO Your last dose was: Your next dose is: Take  by mouth daily. traZODone 100 mg tablet Commonly known as:  Davida Cervantes Your last dose was: Your next dose is: Take 1 Tab by mouth nightly. 100 mg Where to Get Your Medications Information on where to get these meds will be given to you by the nurse or doctor. ! Ask your nurse or doctor about these medications  
  bacitracin zinc ointment  
 cefdinir 300 mg capsule HYDROcodone-acetaminophen 5-325 mg per tablet  
 ondansetron 4 mg disintegrating tablet  
 sodium chloride 0.9 % Spra Opioid Education Prescription Opioids: What You Need to Know: 
 
Prescription opioids can be used to help relieve moderate-to-severe pain and are often prescribed following a surgery or injury, or for certain health conditions. These medications can be an important part of treatment but also come with serious risks. Opioids are strong pain medicines. Examples include hydrocodone, oxycodone, fentanyl, and morphine. Heroin is an example of an illegal opioid. It is important to work with your health care provider to make sure you are getting the safest, most effective care. WHAT ARE THE RISKS AND SIDE EFFECTS OF OPIOID USE? Prescription opioids carry serious risks of addiction and overdose, especially with prolonged use. An opioid overdose, often marked by slow breathing, can cause sudden death. The use of prescription opioids can have a number of side effects as well, even when taken as directed. · Tolerance-meaning you might need to take more of a medication for the same pain relief · Physical dependence-meaning you have symptoms of withdrawal when the medication is stopped. Withdrawal symptoms can include nausea, sweating, chills, diarrhea, stomach cramps, and muscle aches. Withdrawal can last up to several weeks, depending on which drug you took and how long you took it. · Increased sensitivity to pain · Constipation · Nausea, vomiting, and dry mouth · Sleepiness and dizziness · Confusion · Depression · Low levels of testosterone that can result in lower sex drive, energy, and strength · Itching and sweating RISKS ARE GREATER WITH:      
· History of drug misuse, substance use disorder, or overdose · Mental health conditions (such as depression or anxiety) · Sleep apnea · Older age (72 years or older) · Pregnancy Avoid alcohol while taking prescription opioids. Also, unless specifically advised by your health care provider, medications to avoid include: · Benzodiazepines (such as Xanax or Valium) · Muscle relaxants (such as Soma or Flexeril) · Hypnotics (such as Ambien or Lunesta) · Other prescription opioids KNOW YOUR OPTIONS Talk to your health care provider about ways to manage your pain that don't involve prescription opioids. Some of these options may actually work better and have fewer risks and side effects. Options may include: 
· Pain relievers such as acetaminophen, ibuprofen, and naproxen · Some medications that are also used for depression or seizures · Physical therapy and exercise · Counseling to help patients learn how to cope better with triggers of pain and stress. · Application of heat or cold compress · Massage therapy · Relaxation techniques Be Informed Make sure you know the name of your medication, how much and how often to take it, and its potential risks & side effects. IF YOU ARE PRESCRIBED OPIOIDS FOR PAIN: 
· Never take opioids in greater amounts or more often than prescribed. Remember the goal is not to be pain-free but to manage your pain at a tolerable level. · Follow up with your primary care provider to: · Work together to create a plan on how to manage your pain. · Talk about ways to help manage your pain that don't involve prescription opioids. · Talk about any and all concerns and side effects. · Help prevent misuse and abuse. · Never sell or share prescription opioids · Help prevent misuse and abuse. · Store prescription opioids in a secure place and out of reach of others (this may include visitors, children, friends, and family). · Safely dispose of unused/unwanted prescription opioids: Find your community drug take-back program or your pharmacy mail-back program, or flush them down the toilet, following guidance from the Food and Drug Administration (www.fda.gov/Drugs/ResourcesForYou). · Visit www.cdc.gov/drugoverdose to learn about the risks of opioid abuse and overdose. · If you believe you may be struggling with addiction, tell your health care provider and ask for guidance or call [employer] at 3-734-483-OZKQ. Discharge Instructions Patient Discharge Instructions Homer Chris / [id number] : 1985 Admitted 2018 Discharged: 2018 Patient Discharge Instructions - Rhinoplasty The following instructions have been designed to answer practically every question that might arise regarding the \"do's\" and \"don'ts\" after surgery. You and your family should read these several times to become familiar with them. Follow them faithfully, because those who do generally have the smoothest postoperative course. SWELLING Every operation, no matter how minor, is accompanied by swelling of the surrounding tissues. The amount varies from person to person. The swelling itself is not serious and is to be expected after your surgery. It sometimes is worse on the second postoperative day than it was on the first, and in the mornings. Remember is that swelling will always subside eventually. You can help decrease the swelling in the following ways: 1. Sleep with your head elevated until all of the dressings have been removed from the nose. Use an additional pillow or two under the mattress, if necessary. 2.    Stay up (sitting, standing, walking about) as much as possible after your return home. THIS IS IMPORTANT. Of course, you should rest when you tire. 3.    Avoid bending over or lifting heavy things for one week. Besides aggravating the swelling, this may raise your blood pressure and start a hemorrhage. 4.    Avoid hitting or bumping your new nose. It is wise not to  small children. 5.    Avoid excessive sunning of the face during the first month after your operation. A sunscreen is always advisable, but a total sunblock is suggested for the first month. 6.    Do not tweeze your eyebrows for one week. 7.    Avoid \"sniffing\", that is, constantly attempting to pull air through the nose as some people do when their nose feels blocked. This will not relieve the sensation of blockage - it will only aggravate it because the suction created on the inside will cause more swelling. 8.    Avoid rubbing the nostrils and the base of the nose with Kleenex or a handkerchief. Not only will this aggravate the swelling, but also it may cause infection, bleeding, or the accumulation of scar tissue inside the nose. Use the \"moustache\" gauze dressing instead if discharge is excessive. DISCOLORATION It is not unusual to have varying amounts of discoloration about the face. Like swelling, the discoloration may become more pronounced after you have been discharged. It usually lasts not more than a week or two, all the while decreasing in intensity. If the nasal bones were not reshaped, there is usually very little bruising. The measures which will help your swelling subside will also be working to decrease the amount of discoloration. You can camouflage the discoloration to some extent by using a thick makeup base. NUMBNESS After surgery you will notice that the tip of your nose feels firm, and it is not uncommon for the nose to feel numb for a short time.   If you have incisions inside your nose, you may be able to feel minor irregularities in its surface until all swelling disappears. NASAL PACKING AND BLEEDING It is not uncommon to soak several gauze pads (your moustache dressing) during the first several hours after surgery. The frequency with which these are changed should decrease. If it does not, go to bed, apply ice compresses about the face, and report it to the office by telephone. You may be told to return to the office or hospital. 
 
Change the drip pad as needed using 4x4 gauze and tape. If the gauze becomes stuck to your nose, you can soften it with the nasal saline drops. Whenever the nasal passages are blocked, such as when you have a cold or an allergy, the nasal glands produce more mucous than normal.  Your nose is blocked from the postoperative swelling, so you can expect an increase in mucous drainage for several days. It will be blood tinged and should cause you no concern unless the drainage looks like pure blood and flows freely, as when you cut a finger. If this happens, please call the office immediately. DO NOT attempt to remove blood clots or anything else from the nostrils. If a turbinate resection was part of your nasal procedure, bleeding can occur from this area for up to six weeks after your surgery. Be diligent in using the nasal drops and ointment. This helps the healing process and the dissolving of the crusts that form on the turbinates. Your exercise regimen will be curtailed at least to some extent for the first few weeks following surgery. Upper body exercise is especially prohibited, as it is more likely to cause turbinate bleeding. PAIN There is usually little actual pain following nasal surgery, but you may experience a deep bruised sensation as a result of the postoperative swelling that occurs. As is usually the case with such things, this seems worse at night and when one becomes nervous. The usually prescribed drugs which minimize pain often cause sensations of light-headedness, particularly in the immediate postoperative period. This may seem to make your recovery more tedious. Please take the pain medicine as needed. Do not try to \"tough it out\" if you are uncomfortable. DO NOT take aspirin, ibuprofen, or any other NSAIDs (Non-Steroidal Anti-Inflammatory Drugs). NAUSEA Sometimes the anesthesia, the pain pills, or swallowed blood will make you nauseated. You will receive a prescription for anti-nausea medication to use if needed. DEPRESSION It is not unusual for an individual to go through a period of mild depression twelve to thirty-six hours after surgery. Even though you very much want this surgery, and even though we have tried to tell you what to expect postoperatively, you may be somewhat shocked at seeing your own face swollen and bruised. This is a very temporary condition which will subside shortly. The best \"treatment\" is to busy yourself with the details of your postoperative care and try to remember that the recovery period will soon be over. INSOMNIA You may experience some difficulty falling asleep. For this we have prescribed a sleeping pill. If you must take one, remember that such drugs make some people feel light-headed and weak. KEEPING A STIFF UPPER LIP The upper lip is important in nasal surgery, as much work is done in this area. To keep the healing tissues from being disturbed, do not move your upper lip for as long as the bandage is in place. Avoid pursing the lips such as in kissing for ten days. Do not pull the upper lip down as women do when applying lipstick. Apply lipstick with a brush. Be careful not to manipulate the upper lip excessively when brushing your teeth. Avoid gum or foods that are hard to chew. Soups, mashed potatoes, stewed chicken, hamburger steak, or any easily chewed food is permissible. CLEANING THE NOSE Don't blow the nose at all for ten days. After that, blow through both sides at once. Do not compress one side. The outside and near inside of the nostrils may need to be cleaned with a Q-tip moistened with warm water or hydrogen peroxide if crusting is present. The antibiotic ointment that you will be prescribed, usually Bacitracin, should be applied to the inside of the nose with a Q-tip 3 to 4 times a day. Twist the Q-tip around inside gently; you can go in about an inch or until you feel any resistance. This will help prevent crusting and help you to breathe better. You were also prescribed a saline spray. Apply a few sprays on each side every few hours while awake. DRYNESS OF THE LIPS If your lips become dry from breathing through your mouth, coat them with Vaseline or lipstick. A vaporizer with plain water by the bedside at night might be a helpful addition. TEMPERATURE Generally, the body temperature does not rise much above 100 degrees following nasal surgery. This rise usually occurs if the patient becomes slightly dehydrated. Be sure to drink plenty of fluid after surgery. Report any persistent temperature above 100 degrees. WEAKNESS It is not unusual for a person who has had an anesthetic or any type of operation to feel weak, have palpitations, break out in \"cold sweats\", or get dizzy. This gradually clears up in a few days without medication. MEDICATION Almost all patients will be prescribed an antibiotic to be taken after surgery. Obtain explicit instructions about this medicine from the nurse. Multivitamins with vitamin C are suggested for the pre- and postoperative periods, and can be obtained by you without a prescription. We strongly discourage you from taking any Vitamin E preparations prior to or after surgery. These may increase the probability of bleeding.   If you develop a rash or other reaction while you are taking one of the medicines, this could mean that you are developing an allergy to the medicine. If this occurs, please stop taking your medications and call the office immediately. YOUR FIRST POSTOPERATIVE OFFICE VISIT The appointment for your first postop visit is usually made prior to surgery. This appointment will likely be for the day after surgery. The nose will be examined and cleaned, and the post-operative instructions will be reviewed. It is important that you try to eat or drink something before coming into the office the day after surgery. Ideally, this should be high in calories and protein, such as milk and cookies. If you don't feel up to this, at least a soft drink with high sugar content is advisable. POSTOPERATIVE CARE Following your surgery, we will want to see you in the office at regularly scheduled intervals to monitor your progress. RESUMING ACTIVITIES While the bandage is in place, don't wear any pull over clothing. AVOID STRENUOUS ATHLETIC ACTIVITY FOR ONE MONTH, including swimming, jogging, aerobics, etc.  No diving or water skiing for 2 months. No contact sports for 6 months. Avoid sneezing until the bandage is removed. If you must sneeze, let it come out like a cough - through the mouth. If it becomes a real problem, we will prescribe medication to alleviate the condition. Eyeglasses may be worn as long as the metal splint remains on the nose. After the splint is removed, glasses  must be suspended from the forehead for a period of about six weeks. If this is not done, the pressure of your glasses may change the contour of your nose. Your glasses can be suspended from the nose after your splint is removed in three ways. One way is to use a piece of tape to hold the glasses on your forehead so that the weight is off your nose. Contact lenses may be worn the day after surgery. RETURNING TO WORK OR SCHOOL The average patient is able to return to school the day after the bandage is removed. That will be about five to six days after your surgery. Some hardy souls have returned earlier. Returning to work depends on several factors: the amount of physical activity involved in your position, the amount of public contact your job requires, and the amount of swelling and discoloration that you may develop. On the average, you may return to work on the 8th to 10th postoperative day. INJURY TO THE NOSE Some individuals sustain accidents during the early postoperative period. You need not be too concerned unless the blow is hard enough to cause significant bleeding, swelling or pain. If a blow is sustained while the metal splint is still on, this should help protect the nose. However, for the first five weeks after the nasal splint is removed, more attention should be paid to any injury to the nose. Blows to the nose can cause the nasal bones to become deviated. Please report any accident to the office immediately if you feel it was a significant bump. Otherwise, let us know about it at your next visit. FINALLY Remember the things you were told before your operation: When the bandage is first removed, your nose may appear fat and turned up too much. This is caused by the operative swelling over the nose and in the upper lip. The swelling will subside to a great extent during the next week. However, remember that it will take up to one or two years for all the swelling to disappear and for your nose to reach its final contour. The discoloration will gradually disappear over a period of seven to ten days, in most cases. The thicker and oilier the skin, the longer it takes for the swelling to subside. The upper lip may seem stiff for some time after surgery, and you may feel that this interferes with your smile. Be patient. This will disappear within a few weeks. The tip of the nose sometimes feels numb after nasal surgery. This will eventually disappear. Occasionally, the upper teeth will have tingling if extensive septal work was necessary. This, too, will resolve with time. Follow-up with Eleni Chavira MD in 5 days (call for appointment). If you have any questions, please call us at [phone]. We are always happy to answer your questions, and if you should have a problem, this number is answered 24 hours a day. DISCHARGE SUMMARY from Nurse PATIENT INSTRUCTIONS: 
 
 
F-face looks uneven A-arms unable to move or move unevenly S-speech slurred or non-existent T-time-call 911 as soon as signs and symptoms begin-DO NOT go Back to bed or wait to see if you get better-TIME IS BRAIN. Warning Signs of HEART ATTACK Call 911 if you have these symptoms: 
? Chest discomfort. Most heart attacks involve discomfort in the center of the chest that lasts more than a few minutes, or that goes away and comes back. It can feel like uncomfortable pressure, squeezing, fullness, or pain. ? Discomfort in other areas of the upper body. Symptoms can include pain or discomfort in one or both arms, the back, neck, jaw, or stomach. ? Shortness of breath with or without chest discomfort. ? Other signs may include breaking out in a cold sweat, nausea, or lightheadedness. Don't wait more than five minutes to call [address]! Fast action can save your life. Calling 911 is almost always the fastest way to get lifesaving treatment. Emergency Medical Services staff can begin treatment when they arrive  up to an hour sooner than if someone gets to the hospital by car. The discharge information has been reviewed with the spouse. The spouse verbalized understanding. Discharge medications reviewed with the spouse and appropriate educational materials and side effects teaching were provided. ___________________________________________________________________________________________________________________________________ Introducing [facility]! [employer] introduces [employer] patient portal. Now you can access parts of your medical record, email your doctor's office, and request medication refills online. 1. In your internet browser, go to [email or web address] 2. Click on the First Time User? Click Here link in the Sign In box. You will see the New Member Sign Up page. 3. Enter your [employer] Access Code exactly as it appears below. You will not need to use this code after youve completed the sign-up process. If you do not sign up before the expiration date, you must request a new code. · [employer] Access Code: [email or web address] Expires: 10/22/2018 12:13 PM 
 
4. Enter the last four digits of your Social Security Number (xxxx) and Date of Birth (mm/dd/yyyy) as indicated and click Submit. You will be taken to the next sign-up page. 5. Create a [employer] ID. This will be your [employer] login ID and cannot be changed, so think of one that is secure and easy to remember. 6. Create a [employer] password. You can change your password at any time. 7. Enter your Password Reset Question and Answer. This can be used at a later time if you forget your password. 8. Enter your e-mail address. You will receive e-mail notification when new information is available in [address] 9. Click Sign Up. You can now view and download portions of your medical record. 10. Click the Download Summary menu link to download a portable copy of your medical information. If you have questions, please visit the Frequently Asked Questions section of the [employer] website. Remember, [employer] is NOT to be used for urgent needs. For medical emergencies, dial 911. Now available from your iPhone and Android! Introducing Dontrell Vinson As a [facility] patient, I wanted to make you aware of our electronic visit tool called Dontrell Vinson. [facility] 24/7 allows you to connect within minutes with a medical provider 24 hours a day, seven days a week via a mobile device or tablet or logging into a secure website from your computer. You can access Dontrell Vinson from anywhere in the [place]. A virtual visit might be right for you when you have a simple condition and feel like you just dont want to get out of bed, or cant get away from work for an appointment, when your regular [facility] provider is not available (evenings, weekends or holidays), or when youre out of town and need minor care. Electronic visits cost only $49 and if the [facility] 24/[facility] provider determines a prescription is needed to treat your condition, one can be electronically transmitted to a nearby pharmacy*. Please take a moment to enroll today if you have not already done so. The enrollment process is free and takes just a few minutes. To enroll, please download the [facility] 24/7 kalpana to your tablet or phone, or visit [email or web address] to enroll on your computer. And, as an [address] [place] [address] patient with a [employer] account, the results of your visits will be scanned into your electronic medical record and your primary care provider will be able to view the scanned results. We urge you to continue to see your regular [facility] provider for your ongoing medical care. And while your primary care provider may not be the one available when you seek a Dontrell Vinson virtual visit, the peace of mind you get from getting a real diagnosis real time can be priceless. For more information on Dontrell Vinson, view our Frequently Asked Questions (FAQs) at [email or web address]. Sincerely, 
 
Luana Albert MD 
Chief Medical Officer [address] *:  certain medications cannot be prescribed via Dontrell Vinson Providers Seen During Your Hospitalization Provider Specialty Primary office phone Farhat Spence MD Otolaryngology [phone] Your Primary Care Physician (PCP) Primary Care Physician Office Phone Office Fax OTHER, PHYS ** None ** ** None ** You are allergic to the following Allergen Reactions Shellfish Derived Anaphylaxis Tobramycin Other (comments) Eye swelling Recent Documentation Height Weight BMI Smoking Status 1.829 m 83.9 kg 25.09 kg/m2 Former Smoker Emergency Contacts Name Discharge Info Relation Home Work Mobile [address] CAREGIVER [3] Spouse [3] [phone] Patient Belongings The following personal items are in your possession at time of discharge: 
  Dental Appliances: None  Visual Aid: None      Home Medications: None   Jewelry: None  Clothing: Footwear, Shorts, Socks, Undergarments, Pants (placed in pt belongings locker)    Other Valuables: None Please provide this summary of care documentation to your next provider. Signatures-by signing, you are acknowledging that this After Visit Summary has been reviewed with you and you have received a copy. Patient Signature:  ____________________________________________________________ Date:  ____________________________________________________________  
  
Usha Felicia Provider Signature:  ____________________________________________________________ Date:  ____________________________________________________________

## 2018-07-25 NOTE — BRIEF OP NOTE
BRIEF OPERATIVE NOTE    Date of Procedure: 7/25/2018   Preoperative Diagnosis: SEPTAL DEVIATION, TURBINATE HYPERTROPHY, NASAL VALVE OBSTRUCTION ACQUIRED DEFORMITY  Postoperative Diagnosis: SEPTAL DEVIATION, TURBINATE HYPERTROPHY,     Procedure(s):   RHINOPLASTY WITH MAJOR SEPTAL REPAIR, TURBINATE REDUCTION CARTILAGE GRAFT SYSTEM  Surgeon(s) and Role:     * Shaquille Monae MD - Primary         Surgical Assistant: none    Surgical Staff:  Circ-1: Rosalia Ferrera RN  Scrub Tech-1: Shawna Chao  Scrub Tech-Relief: Christine Wanub RN - Intern: Alejandra Vaca RN  Float Staff: Marcelle Woodson RN  Event Time In   Incision Start 1423   Incision Close 1601     Anesthesia: General   Estimated Blood Loss: min  Specimens: * No specimens in log *   Findings: severe deviated septum    Complications: none  Implants: * No implants in log *

## 2018-07-25 NOTE — DISCHARGE INSTRUCTIONS
Patient Discharge Instructions    Juanjose Bishop / [id number] : 1985    Admitted 2018 Discharged: 2018            Patient Discharge Instructions - Rhinoplasty     The following instructions have been designed to answer practically every question that might arise regarding the \"do's\" and \"don'ts\" after surgery. You and your family should read these several times to become familiar with them. Follow them faithfully, because those who do generally have the smoothest postoperative course. SWELLING  Every operation, no matter how minor, is accompanied by swelling of the surrounding tissues. The amount varies from person to person. The swelling itself is not serious and is to be expected after your surgery. It sometimes is worse on the second postoperative day than it was on the first, and in the mornings. Remember is that swelling will always subside eventually. You can help decrease the swelling in the following ways:    1. Sleep with your head elevated until all of the dressings have been removed from the nose. Use an additional pillow or two under the mattress, if necessary. 2.    Stay up (sitting, standing, walking about) as much as possible after your return home. THIS IS IMPORTANT. Of course, you should rest when you tire. 3.    Avoid bending over or lifting heavy things for one week. Besides aggravating the swelling, this may raise your blood pressure and start a hemorrhage. 4.    Avoid hitting or bumping your new nose. It is wise not to  small children. 5.    Avoid excessive sunning of the face during the first month after your operation. A sunscreen is always advisable, but a total sunblock is suggested for the first month. 6.    Do not tweeze your eyebrows for one week. 7.    Avoid \"sniffing\", that is, constantly attempting to pull air through the nose as some people do when their nose feels blocked.   This will not relieve the sensation of blockage - it will only aggravate it because the suction created on the inside will cause more swelling. 8.    Avoid rubbing the nostrils and the base of the nose with Kleenex or a handkerchief. Not only will this aggravate the swelling, but also it may cause infection, bleeding, or the accumulation of scar tissue inside the nose. Use the \"moustache\" gauze dressing instead if discharge is excessive. DISCOLORATION  It is not unusual to have varying amounts of discoloration about the face. Like swelling, the discoloration may become more pronounced after you have been discharged. It usually lasts not more than a week or two, all the while decreasing in intensity. If the nasal bones were not reshaped, there is usually very little bruising. The measures which will help your swelling subside will also be working to decrease the amount of discoloration. You can camouflage the discoloration to some extent by using a thick makeup base. NUMBNESS  After surgery you will notice that the tip of your nose feels firm, and it is not uncommon for the nose to feel numb for a short time. If you have incisions inside your nose, you may be able to feel minor irregularities in its surface until all swelling disappears. NASAL PACKING AND BLEEDING  It is not uncommon to soak several gauze pads (your moustache dressing) during the first several hours after surgery. The frequency with which these are changed should decrease. If it does not, go to bed, apply ice compresses about the face, and report it to the office by telephone. You may be told to return to the office or hospital.    Change the drip pad as needed using 4x4 gauze and tape. If the gauze becomes stuck to your nose, you can soften it with the nasal saline drops.       Whenever the nasal passages are blocked, such as when you have a cold or an allergy, the nasal glands produce more mucous than normal.  Your nose is blocked from the postoperative swelling, so you can expect an increase in mucous drainage for several days. It will be blood tinged and should cause you no concern unless the drainage looks like pure blood and flows freely, as when you cut a finger. If this happens, please call the office immediately. DO NOT attempt to remove blood clots or anything else from the nostrils. If a turbinate resection was part of your nasal procedure, bleeding can occur from this area for up to six weeks after your surgery. Be diligent in using the nasal drops and ointment. This helps the healing process and the dissolving of the crusts that form on the turbinates. Your exercise regimen will be curtailed at least to some extent for the first few weeks following surgery. Upper body exercise is especially prohibited, as it is more likely to cause turbinate bleeding. PAIN  There is usually little actual pain following nasal surgery, but you may experience a deep bruised sensation as a result of the postoperative swelling that occurs. As is usually the case with such things, this seems worse at night and when one becomes nervous. The usually prescribed drugs which minimize pain often cause sensations of light-headedness, particularly in the immediate postoperative period. This may seem to make your recovery more tedious. Please take the pain medicine as needed. Do not try to \"tough it out\" if you are uncomfortable. DO NOT take aspirin, ibuprofen, or any other NSAIDs (Non-Steroidal Anti-Inflammatory Drugs). NAUSEA  Sometimes the anesthesia, the pain pills, or swallowed blood will make you nauseated. You will receive a prescription for anti-nausea medication to use if needed. DEPRESSION  It is not unusual for an individual to go through a period of mild depression twelve to thirty-six hours after surgery.   Even though you very much want this surgery, and even though we have tried to tell you what to expect postoperatively, you may be somewhat shocked at seeing your own face swollen and bruised. This is a very temporary condition which will subside shortly. The best \"treatment\" is to busy yourself with the details of your postoperative care and try to remember that the recovery period will soon be over. INSOMNIA  You may experience some difficulty falling asleep. For this we have prescribed a sleeping pill. If you must take one, remember that such drugs make some people feel light-headed and weak. KEEPING A STIFF UPPER LIP  The upper lip is important in nasal surgery, as much work is done in this area. To keep the healing tissues from being disturbed, do not move your upper lip for as long as the bandage is in place. Avoid pursing the lips such as in kissing for ten days. Do not pull the upper lip down as women do when applying lipstick. Apply lipstick with a brush. Be careful not to manipulate the upper lip excessively when brushing your teeth. Avoid gum or foods that are hard to chew. Soups, mashed potatoes, stewed chicken, hamburger steak, or any easily chewed food is permissible. CLEANING THE NOSE  Don't blow the nose at all for ten days. After that, blow through both sides at once. Do not compress one side. The outside and near inside of the nostrils may need to be cleaned with a Q-tip moistened with warm water or hydrogen peroxide if crusting is present. The antibiotic ointment that you will be prescribed, usually Bacitracin, should be applied to the inside of the nose with a Q-tip 3 to 4 times a day. Twist the Q-tip around inside gently; you can go in about an inch or until you feel any resistance. This will help prevent crusting and help you to breathe better. You were also prescribed a saline spray. Apply a few sprays on each side every few hours while awake. DRYNESS OF THE LIPS  If your lips become dry from breathing through your mouth, coat them with Vaseline or lipstick.   A vaporizer with plain water by the bedside at night might be a helpful addition. TEMPERATURE  Generally, the body temperature does not rise much above 100 degrees following nasal surgery. This rise usually occurs if the patient becomes slightly dehydrated. Be sure to drink plenty of fluid after surgery. Report any persistent temperature above 100 degrees. WEAKNESS  It is not unusual for a person who has had an anesthetic or any type of operation to feel weak, have palpitations, break out in \"cold sweats\", or get dizzy. This gradually clears up in a few days without medication. MEDICATION  Almost all patients will be prescribed an antibiotic to be taken after surgery. Obtain explicit instructions about this medicine from the nurse. Multivitamins with vitamin C are suggested for the pre- and postoperative periods, and can be obtained by you without a prescription. We strongly discourage you from taking any Vitamin E preparations prior to or after surgery. These may increase the probability of bleeding. If you develop a rash or other reaction while you are taking one of the medicines, this could mean that you are developing an allergy to the medicine. If this occurs, please stop taking your medications and call the office immediately. YOUR FIRST POSTOPERATIVE OFFICE VISIT  The appointment for your first postop visit is usually made prior to surgery. This appointment will likely be for the day after surgery. The nose will be examined and cleaned, and the post-operative instructions will be reviewed. It is important that you try to eat or drink something before coming into the office the day after surgery. Ideally, this should be high in calories and protein, such as milk and cookies. If you don't feel up to this, at least a soft drink with high sugar content is advisable. POSTOPERATIVE CARE  Following your surgery, we will want to see you in the office at regularly scheduled intervals to monitor your progress.     RESUMING ACTIVITIES  While the bandage is in place, don't wear any pull over clothing. AVOID STRENUOUS ATHLETIC ACTIVITY FOR ONE MONTH, including swimming, jogging, aerobics, etc.  No diving or water skiing for 2 months. No contact sports for 6 months. Avoid sneezing until the bandage is removed. If you must sneeze, let it come out like a cough - through the mouth. If it becomes a real problem, we will prescribe medication to alleviate the condition. Eyeglasses may be worn as long as the metal splint remains on the nose. After the splint is removed, glasses  must be suspended from the forehead for a period of about six weeks. If this is not done, the pressure of your glasses may change the contour of your nose. Your glasses can be suspended from the nose after your splint is removed in three ways. One way is to use a piece of tape to hold the glasses on your forehead so that the weight is off your nose. Contact lenses may be worn the day after surgery. RETURNING TO WORK OR SCHOOL  The average patient is able to return to school the day after the bandage is removed. That will be about five to six days after your surgery. Some hardy souls have returned earlier. Returning to work depends on several factors: the amount of physical activity involved in your position, the amount of public contact your job requires, and the amount of swelling and discoloration that you may develop. On the average, you may return to work on the 8th to 10th postoperative day. INJURY TO THE NOSE  Some individuals sustain accidents during the early postoperative period. You need not be too concerned unless the blow is hard enough to cause significant bleeding, swelling or pain. If a blow is sustained while the metal splint is still on, this should help protect the nose. However, for the first five weeks after the nasal splint is removed, more attention should be paid to any injury to the nose.   Blows to the nose can cause the nasal bones to become deviated. Please report any accident to the office immediately if you feel it was a significant bump. Otherwise, let us know about it at your next visit. FINALLY  Remember the things you were told before your operation:    When the bandage is first removed, your nose may appear fat and turned up too much. This is caused by the operative swelling over the nose and in the upper lip. The swelling will subside to a great extent during the next week. However, remember that it will take up to one or two years for all the swelling to disappear and for your nose to reach its final contour. The discoloration will gradually disappear over a period of seven to ten days, in most cases. The thicker and oilier the skin, the longer it takes for the swelling to subside. The upper lip may seem stiff for some time after surgery, and you may feel that this interferes with your smile. Be patient. This will disappear within a few weeks. The tip of the nose sometimes feels numb after nasal surgery. This will eventually disappear. Occasionally, the upper teeth will have tingling if extensive septal work was necessary. This, too, will resolve with time. Follow-up with Vineet Winn MD in 5 days (call for appointment). If you have any questions, please call us at [phone]. We are always happy to answer your questions, and if you should have a problem, this number is answered 24 hours a day. DISCHARGE SUMMARY from Nurse    PATIENT INSTRUCTIONS:    After general anesthesia or intravenous sedation, for 24 hours or while taking prescription Narcotics:  · Limit your activities  · Do not drive and operate hazardous machinery  · Do not make important personal or business decisions  · Do  not drink alcoholic beverages  · If you have not urinated within 8 hours after discharge, please contact your surgeon on call.     Report the following to your surgeon:  · Excessive pain, swelling, redness or odor of or around the surgical area  · Temperature over 100.5  · Nausea and vomiting lasting longer than 4 hours or if unable to take medications  · Any signs of decreased circulation or nerve impairment to extremity: change in color, persistent  numbness, tingling, coldness or increase pain  · Any questions    What to do at Home:  Recommended activity: Activity as tolerated and no driving for today,     *  Please give a list of your current medications to your Primary Care Provider. *  Please update this list whenever your medications are discontinued, doses are      changed, or new medications (including over-the-counter products) are added. *  Please carry medication information at all times in case of emergency situations. These are general instructions for a healthy lifestyle:    No smoking/ No tobacco products/ Avoid exposure to second hand smoke  Surgeon General's Warning:  Quitting smoking now greatly reduces serious risk to your health. Obesity, smoking, and sedentary lifestyle greatly increases your risk for illness    A healthy diet, regular physical exercise & weight monitoring are important for maintaining a healthy lifestyle    You may be retaining fluid if you have a history of heart failure or if you experience any of the following symptoms:  Weight gain of 3 pounds or more overnight or 5 pounds in a week, increased swelling in our hands or feet or shortness of breath while lying flat in bed. Please call your doctor as soon as you notice any of these symptoms; do not wait until your next office visit. Recognize signs and symptoms of STROKE:    F-face looks uneven    A-arms unable to move or move unevenly    S-speech slurred or non-existent    T-time-call 911 as soon as signs and symptoms begin-DO NOT go       Back to bed or wait to see if you get better-TIME IS BRAIN. Warning Signs of HEART ATTACK     Call 911 if you have these symptoms:   Chest discomfort. Most heart attacks involve discomfort in the center of the chest that lasts more than a few minutes, or that goes away and comes back. It can feel like uncomfortable pressure, squeezing, fullness, or pain.  Discomfort in other areas of the upper body. Symptoms can include pain or discomfort in one or both arms, the back, neck, jaw, or stomach.  Shortness of breath with or without chest discomfort.  Other signs may include breaking out in a cold sweat, nausea, or lightheadedness. Don't wait more than five minutes to call 911 - MINUTES MATTER! Fast action can save your life. Calling 911 is almost always the fastest way to get lifesaving treatment. Emergency Medical Services staff can begin treatment when they arrive -- up to an hour sooner than if someone gets to the hospital by car. The discharge information has been reviewed with the spouse. The spouse verbalized understanding. Discharge medications reviewed with the spouse and appropriate educational materials and side effects teaching were provided.   ___________________________________________________________________________________________________________________________________

## 2018-07-25 NOTE — OP NOTES
[address] REPORT    Torito Carlton  MR#: [id number]  : 1985  ACCOUNT #: [de-identified]   DATE OF SERVICE: 2018    SURGEON: Holger Stafford MD     PREOPERATIVE DIAGNOSES:  1. Septal deviation. 2.  Inferior turbinate hypertrophy. 3.  Internal nasal valve obstruction. 4.  Nasal fracture. POSTOPERATIVE DIAGNOSES:  1. Septal deviation. 2.  Inferior turbinate hypertrophy. 3.  Internal nasal valve obstruction. 4.  Nasal fracture. PROCEDURES PERFORMED:   1. Septoplasty. 2.  Repair of vestibular stenosis (nasal valve obstruction). 3.  Inferior turbinate submucosal resection and outfracture. 4.  Closed reduction of nasal fracture. 5.  Reduction of left middle turbinate kayleigh bullosa. ANESTHESIA:  General.    COMPLICATIONS:  None. ESTIMATED BLOOD LOSS:  Minimal.    SPECIMENS REMOVED:  None. FINDINGS:  The caudal septum was deviated slightly to the left. The cartilaginous and bony septum was deviated severely to the right. There was a large left middle turbinate kayleigh bullosa which was contributing to severe deviation of the bony septum to the right. The right middle turbinate was hypoplastic and lateralized. There was evidence of prior septoplasty with some previous resection of some inferior cartilage in the septum, but there was no evidence of any previous surgical manipulation of the bony septum or the upper cartilaginous septum. There was an external depression on the right side which was due to depression of the right nasal bone as well as the upper lateral cartilage on the right. There was some prominence of the left-sided nasal bone edge as well, which was likely due to a previous fracture. The inferior turbinates were enlarged on both sides. The nasal valve on the right side was correspondingly narrow on the right side corresponding to the outside depression.     INDICATIONS FOR PROCEDURE:  The patient is a 39-year-old male who has a history of multiple traumas to the nose in the past.  He underwent some type of septoplasty in 2001 but then suffered trauma early after that procedure, which caused recurrence of severe nasal obstruction which has not been relieved with medical management. PROCEDURE IN DETAIL:  After informed consent, the patient was taken to the operating room and placed on the table in the supine position. After general anesthesia, the table was turned 180 degrees. The nose was packed with Afrin pledgets and injected with 1% lidocaine with 1:100,000 epinephrine. The patient was prepped and draped in standard fashion. Initially, a left hemitransfixion incision was made with a 15 blade and a mucoperichondrial flap was elevated on the left side posteriorly past the bony cartilaginous junction. This was tedious inferiorly due to the previous surgery, but went smoothly superiorly to elevate mucosa. Dissection was performed carefully down to the floor of the nose and the mucosa was elevated off the floor intact. There was a small tear on the left side during the elevation in the mid portion of the septal flap, which was repaired primarily with 5-0 plain sutures. The mucosa was then elevated on the right side of the septum in submucoperichondrial plane, proceeding posteriorly over the cartilaginous dorsum. This was also brought down to the floor of the nose. There was some maxillary crest deviation to the right and this was trimmed with the small chisel just to shave this flush with the remainder of the septum. Again, there was severe posterior deviation of the bony septum which was pulling the cartilaginous septum to the left. To remedy this, initially some cartilage was harvested for grafting purposes, leaving about a 1.5 to 2 cm caudal and dorsal strut, and this was saved in bacitracin saline for later use.   The bony septum was accessed through the left side and then using the caudal elevator, the mucosa was elevated on the bony septum extending more posteriorly and superiorly. The heavy scissors was used to make a cut in this bone and then it was fractured off with the Josiah forceps and removed. The cartilage was then allowed to return back to the midline after the tension was taken off from the bony septum. There was a dramatic improvement and now the middle turbinate on the right, which was not visible previously, was visible. It was discovered incidentally that the patient had a very large middle turbinate kayleigh bullosa on the left and this was crushed using polyp forceps and after doing this, there was a large view into the middle meatus which was expanded because of the kayleigh bullosa on the left. On the right side, again there was a hypoplastic middle turbinate and there was incidentally also noted a small amount of polypoid tissue at the opening to the middle meatus and this was gently debrided with the 2 mm suction debrider device with the turbinate blade, but in this case was used for the polyp excision. The middle turbinate was infractured on the right to examine the middle meatus and no further polyps were seen. There was a dramatically improved airway at this point. The nasal valve was still somewhat narrow on the right so to remedy this, initially the right nasal bone was outfractured using the [facility] elevator from the inside of the nose. This helped to elevate the upper lateral cartilage off the septum to open the nasal valve a bit. To stabilize this, then a  graft tunnel was made starting at the anterior septal angle using the Pickett elevator, extending up between the upper lateral cartilages and the septum. A  graft was carved from the harvested cartilage which was about 2.5 cm long and this case was fairly thick, which was used to advantage, and it was probably 2 mm wide and this was strong stiff cartilage. This was placed into the pocket and fit nicely.   This visibly reduced the depression in the middle third and definitely opened the internal nasal valve on the right side. The left-sided nasal valve looked reasonable and so no further  grafts were placed. Some transmucosal 4-0 plain sutures were placed through the  graft high in the septum from the inside of the nose to stabilize its position, although it was quite   tight in the pocket anyway. The caudal septum was disarticulated from the anterior nasal spine and repositioned into the midline, which was a few millimeters back to the right and then this was secured in this position with a 4-0 PDS suture. Dissection was performed between the medial crura to allow the medial crura to rest on the relocated caudal septum without any tension. There was some excess caudal septum in this case, especially after relocation, so this was trimmed for about 2 mm along the caudal septum. This was done mostly posteriorly. The septum was palpated to be in the midline at this point through the medial crura over the columella. 4-0 plain sutures on the General Pean needle were then used to stabilize the position of the columella onto the relocated caudal septum in a tongue-and-groove type maneuver and this is done in a few locations along the columella. Tip projection was maintained and there was very good tip support at this point. Septal pocket was irrigated with bacitracin saline and suctioned. Remaining cartilage was crushed and placed back between the septal flaps to reskeletonize the septum. Again, the small perforation on the left was closed with 5-0 plain sutures directly and care was taken to make sure the crushed cartilage was placed beneath this repair to diminish the possibility of a septal perforation forming. The septal incision was closed with 5-0 plain sutures and then a running mattress 5-0 plain was placed to reappose the septal flaps.   There was again slight prominence of the bone on the left side and so it was elected in this case to rasp this and so a left intercartilaginous incision was made with a 15 blade and a bloodless plane was developed in the immediate supra perichondrial plane over the upper lateral cartilage extending onto the nasal bone where the periosteal elevator was used to elevate the periosteum. The brianna carbide rasp was then used to gently rasp this prominence on the left side, which then made the dorsum appear dramatically straighter. This pocket was irrigated and then closed with 5-0 plain sutures. The inferior turbinates were reduced using a 2 mm suction debrider device entered through an anterior stab incision and then through the same incision, a piecemeal submucosal outfracture of the inferior turbinate bone was done along its length with the Veronica elevator. The airway was dramatically improved at this point at all levels. The nose was suctioned and the nasopharynx. No bleeding was seen. Corrigan splints coated in bacitracin were placed on both sides. These were reduced by about one-third by trimming off the posterior end. There were secured anteriorly with a 3-0 Prolene suture. An external splint composed of brown tape and a metal splint was placed in the usual fashion. Some rolled Gelfoam coated in bacitracin was placed beneath the nasal bone in the region of the internal nasal valve on the right side for support. A drip pad was placed into the nose. The patient was then awakened from anesthesia, extubated, and taken to the PACU in stable condition. There were no complications. The patient tolerated the procedure well.       MD JESSICA Figueroa / Girma Winn  D: 07/25/2018 16:33     T: 07/25/2018 17:14  JOB #: [id number]

## 2018-07-25 NOTE — H&P
Otolaryngology, Head and Neck Surgery    Chief Complaint:    History of Present Illness:   Patient is a 34 y.o. male who is being seen for nasal obstruction. Underwent some type of septorhinoplasty in 2001, but suffered trauma to the nose soon after that (weeks). He has severe right sided obstruction. No change with medical management.       Past Medical History:   Diagnosis Date    Adverse effect of anesthesia     woke up twice during two of his surgeries    Arm fracture, left     radius & ulna    Compression fracture of thoracic vertebra (HCC)     T-9 threw T-12    Gout     Migraine     Multiple fractures of pelvis with disruption of pelvic ring (HCC)     in 2 places and portion missing from pelvic ring    Psychiatric disorder     PTSD    Sternal fracture     Torn meniscus     left knee      Family History   Problem Relation Age of Onset    No Known Problems Mother     Seizures Father      epilepsy    Breast Cancer Maternal Grandmother       Social History   Substance Use Topics    Smoking status: Former Smoker    Smokeless tobacco: Current User     Types: Chew    Alcohol use No     Past Surgical History:   Procedure Laterality Date    HX ANKLE FRACTURE TX      Right, repair with freedom    HX BACK SURGERY      T9 threw T12     HX ORTHOPAEDIC      left arm fracture repair of radius & ulna    HX ORTHOPAEDIC      pelvic fracture repair    HX ORTHOPAEDIC      Right, foot fracture repair    HX ORTHOPAEDIC      Left, great toe amputation re-attachment & foot repair with rods      Current Facility-Administered Medications   Medication Dose Route Frequency    lidocaine (PF) (XYLOCAINE) 10 mg/mL (1 %) injection 0.1 mL  0.1 mL SubCUTAneous PRN    lactated Ringers infusion  125 mL/hr IntraVENous CONTINUOUS    sodium chloride (NS) flush 5-10 mL  5-10 mL IntraVENous Q8H    sodium chloride (NS) flush 5-10 mL  5-10 mL IntraVENous PRN    naloxone (NARCAN) injection 0.04 mg  0.04 mg IntraVENous Multiple  flumazenil (ROMAZICON) 0.1 mg/mL injection 0.2 mg  0.2 mg IntraVENous Multiple    sodium chloride (NS) flush 5-10 mL  5-10 mL IntraVENous Q8H    sodium chloride (NS) flush 5-10 mL  5-10 mL IntraVENous PRN    ceFAZolin (ANCEF) 2 g/20 mL in sterile water IV syringe  2 g IntraVENous ONCE    dexamethasone (DECADRON) 4 mg/mL injection 10 mg  10 mg IntraVENous ONCE      Allergies   Allergen Reactions    Shellfish Derived Anaphylaxis    Tobramycin Other (comments)     Eye swelling        Review of Systems:  Pertinent items are noted in the History of Present Illness. Objective:     Patient Vitals for the past 8 hrs:   BP Temp Pulse Resp SpO2   18 1009 131/85 97.7 °F (36.5 °C) 61 12 97 %     Temp (24hrs), Av.7 °F (36.5 °C), Min:97.7 °F (36.5 °C), Max:97.7 °F (36.5 °C)         PHYSICAL EXAM:    CONSTITUTIONAL:  Well nourished individual in no acute distress. The patient is able to communicate with normal voice quality. HEAD AND NECK EXAM:    HEAD & FACE: No head or facial abnormalities present. No masses or lesions present. Overall appearance is normal. Facial strength is normal.  EYES: Conjunctiva normal.  No abnormalities of the lids or globes. Extraocular movements intact and conjugate. EARS: No significant abnormality of the external ear. NOSE:Severe right septal deviation and collapse of the upper lateral cartilage on the right. SINUSES: The paranasal sinus regions are non-tender to palpation. ORAL CAVITY/OROPHARYNX: Lips and gums are without lesions. Oral cavity and oropharynx are without mucosal lesions or abnormalities. Tonsillar fossae, palate, tongue and uvula without abnormality. No edema. No erythema. NECK: No palpable lymphadenopathy or other masses in the neck. The trachea and larynx are midline. No thyroid enlargement or nodules appreciated. No abnormality of the parotid or submandibular glands present. LYMPHATIC: No lymphadenopathy in the neck/head.   CHEST:  CTA  HEART: RRR  NEUROLOGIC/PSYCHIATRIC:    NEUROLOGIC:  Cranial nerves 3, 4, 5, 6, 7, 10 (soft palate elevation), 11 and 12 bilaterally intact and symmetric. ORIENTATION/MOOD/AFFECT: Oriented to person, place, time and general circumstances. Mood and affect appropriate. Assessment:     Septal deviation, turbinate hypertrophy, nasal valve obstruction. Plan:     Ready to proceed with revision septoplasty/nasal valve repair. .  Questions answered. Consent signed. Signed By: Yeni Yost MD     July 25, 2018       Byron Murphy MD, [facility], [facility] Facial Plastic Surgery  [email or web address]  [email or web address]  [address] [place] [address], [address] [facility],[address]  Ph:  [phone]  Fax: [phone]

## 2018-08-27 RX ORDER — INDOMETHACIN 50 MG/1
CAPSULE ORAL
Qty: 60 CAP | Refills: 1 | Status: SHIPPED | OUTPATIENT
Start: 2018-08-27 | End: 2019-01-02 | Stop reason: SDUPTHER

## 2018-09-12 ENCOUNTER — TELEPHONE (OUTPATIENT)
Dept: FAMILY MEDICINE CLINIC | Age: 33
End: 2018-09-12

## 2018-09-12 ENCOUNTER — OFFICE VISIT (OUTPATIENT)
Dept: FAMILY MEDICINE CLINIC | Age: 33
End: 2018-09-12

## 2018-09-12 VITALS
DIASTOLIC BLOOD PRESSURE: 78 MMHG | OXYGEN SATURATION: 99 % | SYSTOLIC BLOOD PRESSURE: 112 MMHG | HEIGHT: 72 IN | HEART RATE: 74 BPM | TEMPERATURE: 98.5 F | RESPIRATION RATE: 16 BRPM | BODY MASS INDEX: 26.6 KG/M2 | WEIGHT: 196.4 LBS

## 2018-09-12 DIAGNOSIS — G89.29 CHRONIC PAIN OF LEFT KNEE: ICD-10-CM

## 2018-09-12 DIAGNOSIS — M25.562 CHRONIC PAIN OF LEFT KNEE: ICD-10-CM

## 2018-09-12 DIAGNOSIS — Z00.00 WELL ADULT EXAM: Primary | ICD-10-CM

## 2018-09-12 RX ORDER — EPINEPHRINE 0.3 MG/.3ML
0.3 INJECTION SUBCUTANEOUS
Qty: 0.6 ML | Refills: 1 | Status: SHIPPED | OUTPATIENT
Start: 2018-09-12 | End: 2018-09-12

## 2018-09-12 RX ORDER — EPINEPHRINE 0.3 MG/.3ML
0.3 INJECTION SUBCUTANEOUS
Qty: 0.6 ML | Refills: 2 | Status: SHIPPED | OUTPATIENT
Start: 2018-09-12 | End: 2018-09-12 | Stop reason: RX

## 2018-09-12 NOTE — TELEPHONE ENCOUNTER
Please send Auvi-Q to the pharm. Epipen and generic epipen is on backorder. Pt will try to get this covered.

## 2018-09-12 NOTE — MR AVS SNAPSHOT
[address] [address] 
[phone] Patient: Manjula Dawson MRN: [id number] [facility]:[birth year] Visit Information Date & Time Provider Department Dept. Phone Encounter #  
 9/12/2018  2:00 PM Alo Aguirre [address] [phone] [id number] Upcoming Health Maintenance Date Due DTaP/Tdap/Td series (1 - Tdap) 12/4/2005 Influenza Age 5 to Adult 8/1/2018 Allergies as of 9/12/2018  Review Complete On: 7/25/2018 By: Jena Christina RN Severity Noted Reaction Type Reactions Shellfish Derived High 02/05/2018    Anaphylaxis Tobramycin  02/05/2018    Other (comments) Eye swelling Current Immunizations  Never Reviewed No immunizations on file. Not reviewed this visit You Were Diagnosed With   
  
 Codes Comments Well adult exam    -  Primary ICD-10-CM: Z00.00 ICD-9-CM: V70.0 Chronic pain of left knee     ICD-10-CM: M25.562, G89.29 ICD-9-CM: 719.46, 338.29 Vitals BP Pulse Temp Resp Height(growth percentile) Weight(growth percentile) 112/78 74 98.5 °F (36.9 °C) (Oral) 16 6' (1.829 m) 196 lb 6.4 oz (89.1 kg) SpO2 BMI Smoking Status 99% 26.64 kg/m2 Former Smoker Vitals History BMI and BSA Data Body Mass Index Body Surface Area  
 26.64 kg/m 2 2.13 m 2 Preferred Pharmacy Pharmacy Name Phone Evelyn [facility] [address] [facility], [address] [facility] [phone] Your Updated Medication List  
  
   
This list is accurate as of 9/12/18  2:52 PM.  Always use your most recent med list.  
  
  
  
  
 Biotin 2,500 mcg Cap Take  by mouth daily. colchicine 0.6 mg capsule Commonly known as:  Deamelisa Hazard Take 1 Cap by mouth two (2) times a day. As needed for gout symptoms. cyclobenzaprine 5 mg tablet Commonly known as:  FLEXERIL Take 5 mg by mouth.   
  
 HAIR,SKIN AND NAILS PO  
 Take  by mouth daily. indomethacin 50 mg capsule Commonly known as:  INDOCIN  
TAKE ONE CAPSULE BY MOUTH THREE TIMES A DAY AS NEEDED  
  
 ondansetron 4 mg disintegrating tablet Commonly known as:  ZOFRAN ODT Take 1 Tab by mouth every four (4) hours as needed for Nausea. pregabalin 50 mg capsule Commonly known as:  Nicole Merlin Take 1 Cap by mouth two (2) times a day. rizatriptan 10 mg disintegrating tablet Commonly known as:  MAXALT-MLT Take 1 Tab by mouth once as needed for Migraine for up to 1 dose. TART CHERRY EXTRACT PO Take  by mouth daily. traZODone 100 mg tablet Commonly known as:  Amanda Bernice Take 1 Tab by mouth nightly. We Performed the Following CBC WITH AUTOMATED DIFF [92635 CPT(R)] LIPID PANEL [72997 CPT(R)] METABOLIC PANEL, COMPREHENSIVE [93293 CPT(R)] REFERRAL TO ORTHOPEDIC SURGERY [REF62 Custom] TSH 3RD GENERATION [13954 CPT(R)] Referral Information Referral ID Referred By Referred To  
  
 [id number] [facility] [address] Visits Status Start Date End Date 1 New Request 9/12/18 9/12/19 If your referral has a status of pending review or denied, additional information will be sent to support the outcome of this decision. Introducing [facility]! [facility] introduces PagerDuty patient portal. Now you can access parts of your medical record, email your doctor's office, and request medication refills online. 1. In your internet browser, go to [email or web address]. View the [email or web address] 2. Click on the First Time User? Click Here link in the Sign In box. You will see the New Member Sign Up page. 3. Enter your PagerDuty Access Code exactly as it appears below. You will not need to use this code after youve completed the sign-up process. If you do not sign up before the expiration date, you must request a new code. · Keyade Access Code: [email or web address] Expires: 10/22/2018 12:13 PM 
 
4. Enter the last four digits of your Social Security Number (xxxx) and Date of Birth (mm/dd/yyyy) as indicated and click Submit. You will be taken to the next sign-up page. 5. Create a Keyade ID. This will be your Keyade login ID and cannot be changed, so think of one that is secure and easy to remember. 6. Create a Keyade password. You can change your password at any time. 7. Enter your Password Reset Question and Answer. This can be used at a later time if you forget your password. 8. Enter your e-mail address. You will receive e-mail notification when new information is available in [address] 9. Click Sign Up. You can now view and download portions of your medical record. 10. Click the Download Summary menu link to download a portable copy of your medical information. If you have questions, please visit the Frequently Asked Questions section of the Keyade website. Remember, Keyade is NOT to be used for urgent needs. For medical emergencies, dial 911. Now available from your iPhone and Android! Please provide this summary of care documentation to your next provider. Your primary care clinician is listed as Phys Other. If you have any questions after today's visit, please call [phone].

## 2018-09-12 NOTE — PROGRESS NOTES
1. Have you been to the ER, urgent care clinic since your last visit? Hospitalized since your last visit? No    2. Have you seen or consulted any other health care providers outside of the [address] [place] [address] since your last visit? Include any pap smears or colon screening.  No     Chief Complaint   Patient presents with    Knee Injury     Left    Complete Physical    Labs     Fasting

## 2018-09-13 LAB
ALBUMIN SERPL-MCNC: 4.7 G/DL (ref 3.5–5.5)
ALBUMIN/GLOB SERPL: 2.2 {RATIO} (ref 1.2–2.2)
ALP SERPL-CCNC: 73 IU/L (ref 39–117)
ALT SERPL-CCNC: 36 IU/L (ref 0–44)
AST SERPL-CCNC: 28 IU/L (ref 0–40)
BASOPHILS # BLD AUTO: 0 X10E3/UL (ref 0–0.2)
BASOPHILS NFR BLD AUTO: 1 %
BILIRUB SERPL-MCNC: 0.6 MG/DL (ref 0–1.2)
BUN SERPL-MCNC: 8 MG/DL (ref 6–20)
BUN/CREAT SERPL: 9 (ref 9–20)
CALCIUM SERPL-MCNC: 9.4 MG/DL (ref 8.7–10.2)
CHLORIDE SERPL-SCNC: 104 MMOL/L (ref 96–106)
CHOLEST SERPL-MCNC: 215 MG/DL (ref 100–199)
CO2 SERPL-SCNC: 22 MMOL/L (ref 20–29)
CREAT SERPL-MCNC: 0.87 MG/DL (ref 0.76–1.27)
EOSINOPHIL # BLD AUTO: 0.3 X10E3/UL (ref 0–0.4)
EOSINOPHIL NFR BLD AUTO: 5 %
ERYTHROCYTE [DISTWIDTH] IN BLOOD BY AUTOMATED COUNT: 13.9 % (ref 12.3–15.4)
GLOBULIN SER CALC-MCNC: 2.1 G/DL (ref 1.5–4.5)
GLUCOSE SERPL-MCNC: 99 MG/DL (ref 65–99)
HCT VFR BLD AUTO: 42.9 % (ref 37.5–51)
HDLC SERPL-MCNC: 35 MG/DL
HGB BLD-MCNC: 14.9 G/DL (ref 13–17.7)
IMM GRANULOCYTES # BLD: 0 X10E3/UL (ref 0–0.1)
IMM GRANULOCYTES NFR BLD: 0 %
INTERPRETATION, 910389: NORMAL
LDLC SERPL CALC-MCNC: 126 MG/DL (ref 0–99)
LYMPHOCYTES # BLD AUTO: 1.2 X10E3/UL (ref 0.7–3.1)
LYMPHOCYTES NFR BLD AUTO: 26 %
MCH RBC QN AUTO: 29.7 PG (ref 26.6–33)
MCHC RBC AUTO-ENTMCNC: 34.7 G/DL (ref 31.5–35.7)
MCV RBC AUTO: 86 FL (ref 79–97)
MONOCYTES # BLD AUTO: 0.4 X10E3/UL (ref 0.1–0.9)
MONOCYTES NFR BLD AUTO: 7 %
NEUTROPHILS # BLD AUTO: 3 X10E3/UL (ref 1.4–7)
NEUTROPHILS NFR BLD AUTO: 61 %
PLATELET # BLD AUTO: 225 X10E3/UL (ref 150–379)
POTASSIUM SERPL-SCNC: 5 MMOL/L (ref 3.5–5.2)
PROT SERPL-MCNC: 6.8 G/DL (ref 6–8.5)
RBC # BLD AUTO: 5.02 X10E6/UL (ref 4.14–5.8)
SODIUM SERPL-SCNC: 141 MMOL/L (ref 134–144)
TRIGL SERPL-MCNC: 270 MG/DL (ref 0–149)
TSH SERPL DL<=0.005 MIU/L-ACNC: 0.77 UIU/ML (ref 0.45–4.5)
VLDLC SERPL CALC-MCNC: 54 MG/DL (ref 5–40)
WBC # BLD AUTO: 4.9 X10E3/UL (ref 3.4–10.8)

## 2018-09-13 NOTE — PROGRESS NOTES
Subjective:   Breanne Ellington is a 34 y.o. male presenting for his annual checkup. ROS:  Feeling well. No dyspnea or chest pain on exertion. No abdominal pain, change in bowel habits, black or bloody stools. No urinary tract or prostatic symptoms. No neurological complaints. Specific concerns today: wants referral to ortho for chronic knee pain, already seeing PMR but may also be surgical.    Patient Active Problem List    Diagnosis Date Noted    Bilateral low back pain with bilateral sciatica 02/05/2018    Cervical spine pain 02/05/2018    Migraine with aura and without status migrainosus, not intractable 02/05/2018     Current Outpatient Prescriptions   Medication Sig Dispense Refill    indomethacin (INDOCIN) 50 mg capsule TAKE ONE CAPSULE BY MOUTH THREE TIMES A DAY AS NEEDED 60 Cap 1    ondansetron (ZOFRAN ODT) 4 mg disintegrating tablet Take 1 Tab by mouth every four (4) hours as needed for Nausea. 20 Tab 2    Biotin 2,500 mcg cap Take  by mouth daily.  multivitamin with minerals (HAIR,SKIN AND NAILS PO) Take  by mouth daily.  sour cherry extract (TART CHERRY EXTRACT PO) Take  by mouth daily.  colchicine (MITIGARE) 0.6 mg capsule Take 1 Cap by mouth two (2) times a day. As needed for gout symptoms. 60 Cap 1    cyclobenzaprine (FLEXERIL) 5 mg tablet Take 5 mg by mouth.  rizatriptan (MAXALT-MLT) 10 mg disintegrating tablet Take 1 Tab by mouth once as needed for Migraine for up to 1 dose. 12 Tab 5    pregabalin (LYRICA) 50 mg capsule Take 1 Cap by mouth two (2) times a day. 60 Cap 1    traZODone (DESYREL) 100 mg tablet Take 1 Tab by mouth nightly.  27 Tab 5     Family History   Problem Relation Age of Onset    No Known Problems Mother     Seizures Father      epilepsy    Breast Cancer Maternal Grandmother      Social History   Substance Use Topics    Smoking status: Former Smoker    Smokeless tobacco: Current User     Types: Chew    Alcohol use No             Objective: Visit Vitals    /78    Pulse 74    Temp 98.5 °F (36.9 °C) (Oral)    Resp 16    Ht 6' (1.829 m)    Wt 196 lb 6.4 oz (89.1 kg)    SpO2 99%    BMI 26.64 kg/m2     The patient appears well, alert, oriented x 3, in no distress. ENT normal.  Neck supple. No adenopathy or thyromegaly. TONY. Lungs are clear, good air entry, no wheezes, rhonchi or rales. S1 and S2 normal, no murmurs, regular rate and rhythm. Abdomen is soft without tenderness, guarding, mass or organomegaly.  exam: deferred. Extremities show no edema, normal peripheral pulses. Neurological is normal without focal findings. Assessment/Plan:   healthy adult male  lose weight, increase physical activity, follow low fat diet, follow low salt diet, routine labs ordered. Encounter Diagnoses   Name Primary?  Well adult exam Yes    Chronic pain of left knee      Orders Placed This Encounter    CBC WITH AUTOMATED DIFF    LIPID PANEL    METABOLIC PANEL, COMPREHENSIVE    TSH 3RD GENERATION    CVD REPORT    REFERRAL TO ORTHOPEDIC SURGERY    DISCONTD: EPINEPHrine (EPIPEN) 0.3 mg/0.3 mL injection   . Given referral to Dr. Arnaldo Payton for eval  Refilled epipen for bee allergy  Labs updated  Dev plan with labs  I have discussed the diagnosis with the patient and the intended plan as seen in the above orders. The patient has received an after-visit summary and questions were answered concerning future plans. Patient conveyed understanding of the plan at the time of the visit.     Genny Crain, MSN, ANP  9/13/2018

## 2018-09-18 NOTE — PROGRESS NOTES
RECOMMENDATIONS:  None. Keep up the good work! Work on diet and exercise. Your cholesterol is also borderline high. Watch the diet for red meat/pork, dairy products, and fried/fast foods. Recheck this test: 6 months.

## 2019-01-03 RX ORDER — INDOMETHACIN 50 MG/1
CAPSULE ORAL
Qty: 60 CAP | Refills: 0 | Status: SHIPPED | OUTPATIENT
Start: 2019-01-03 | End: 2019-04-11 | Stop reason: SDUPTHER

## 2019-01-04 ENCOUNTER — DOCUMENTATION ONLY (OUTPATIENT)
Dept: FAMILY MEDICINE CLINIC | Age: 34
End: 2019-01-04

## 2019-01-04 NOTE — PROGRESS NOTES
Colchicine 0.6mg submitted to [facility] via Cover My Meds. Awaiting reponse.    Anna CEBALLOS Case ID: [id number]

## 2019-01-07 NOTE — PROGRESS NOTES
Per [employer] for Colchicine was denied. Pt must meet the following guidelines 1. Have hx of failure, contraindication, or intolerance to brand Mitigare. 2. Require a reduced dose of 0.3mg d/t one of the following: A. Severe renal impairment. B. Severe hepatic impairment. C. Concomitant use of CY inhibitor, P-glycoprotein inhibitor, or protease inhibitor. D. Treatment of familial Mediterranean fever.  E. Intolerable side effects that cannot be managed by extending the dose interval. .  Ref# [id number]

## 2019-01-08 RX ORDER — ALLOPURINOL 100 MG/1
100 TABLET ORAL DAILY
Qty: 30 TAB | Refills: 5 | OUTPATIENT
Start: 2019-01-08

## 2019-01-08 NOTE — PROGRESS NOTES
Patient id x [phone], notified as per Roberto Pan and verbalized understanding. PT would like to try allopurinol, states that he has already been to rheum. Please send to [facility] on file.

## 2019-01-08 NOTE — PROGRESS NOTES
Please let him know, he could try to see Rheum to see if they can get it covered. Can also try allopurinol for gout instead.  Marta Granados

## 2019-04-12 RX ORDER — INDOMETHACIN 50 MG/1
CAPSULE ORAL
Qty: 60 CAP | Refills: 0 | Status: SHIPPED | OUTPATIENT
Start: 2019-04-12 | End: 2019-07-01 | Stop reason: SDUPTHER

## 2019-07-01 ENCOUNTER — TELEPHONE (OUTPATIENT)
Dept: FAMILY MEDICINE CLINIC | Age: 34
End: 2019-07-01

## 2019-07-01 RX ORDER — INDOMETHACIN 50 MG/1
CAPSULE ORAL
Qty: 60 CAP | Refills: 0 | Status: SHIPPED | OUTPATIENT
Start: 2019-07-01 | End: 2019-09-11 | Stop reason: SDUPTHER

## 2019-07-03 RX ORDER — COLCHICINE 0.6 MG/1
0.6 CAPSULE ORAL 2 TIMES DAILY
Qty: 60 CAP | Refills: 1 | Status: SHIPPED | OUTPATIENT
Start: 2019-07-03 | End: 2020-01-31

## 2019-08-12 ENCOUNTER — DOCUMENTATION ONLY (OUTPATIENT)
Dept: FAMILY MEDICINE CLINIC | Age: 34
End: 2019-08-12

## 2019-08-12 NOTE — PROGRESS NOTES
Colchicine submitted to [facility] via Cover My Med[employer]. Awaiting reponse.    Key: [id number]  PA Case ID: [id number]

## 2019-09-11 RX ORDER — INDOMETHACIN 50 MG/1
CAPSULE ORAL
Qty: 60 CAP | Refills: 0 | Status: SHIPPED | OUTPATIENT
Start: 2019-09-11 | End: 2019-11-24 | Stop reason: SDUPTHER

## 2019-10-10 ENCOUNTER — DOCUMENTATION ONLY (OUTPATIENT)
Dept: FAMILY MEDICINE CLINIC | Age: 34
End: 2019-10-10

## 2019-11-24 RX ORDER — RIZATRIPTAN BENZOATE 10 MG/1
TABLET, ORALLY DISINTEGRATING ORAL
Qty: 12 TAB | Refills: 4 | Status: SHIPPED | OUTPATIENT
Start: 2019-11-24 | End: 2020-12-11

## 2019-11-24 RX ORDER — INDOMETHACIN 50 MG/1
CAPSULE ORAL
Qty: 60 CAP | Refills: 0 | Status: SHIPPED | OUTPATIENT
Start: 2019-11-24 | End: 2020-01-31

## 2020-01-31 RX ORDER — COLCHICINE 0.6 MG/1
CAPSULE ORAL
Qty: 60 CAP | Refills: 0 | Status: SHIPPED | OUTPATIENT
Start: 2020-01-31 | End: 2020-04-04

## 2020-01-31 RX ORDER — INDOMETHACIN 50 MG/1
CAPSULE ORAL
Qty: 60 CAP | Refills: 0 | Status: SHIPPED | OUTPATIENT
Start: 2020-01-31 | End: 2020-04-04

## 2020-04-04 RX ORDER — COLCHICINE 0.6 MG/1
CAPSULE ORAL
Qty: 60 CAP | Refills: 0 | Status: SHIPPED | OUTPATIENT
Start: 2020-04-04

## 2020-04-04 RX ORDER — INDOMETHACIN 50 MG/1
CAPSULE ORAL
Qty: 60 CAP | Refills: 0 | Status: SHIPPED | OUTPATIENT
Start: 2020-04-04 | End: 2020-06-03

## 2020-06-03 RX ORDER — INDOMETHACIN 50 MG/1
CAPSULE ORAL
Qty: 60 CAP | Refills: 0 | Status: SHIPPED | OUTPATIENT
Start: 2020-06-03 | End: 2020-07-25 | Stop reason: SDUPTHER

## 2020-07-25 RX ORDER — INDOMETHACIN 50 MG/1
CAPSULE ORAL
Qty: 60 CAP | Refills: 0 | Status: SHIPPED | OUTPATIENT
Start: 2020-07-25 | End: 2020-10-05

## 2020-10-05 RX ORDER — INDOMETHACIN 50 MG/1
CAPSULE ORAL
Qty: 60 CAP | Refills: 0 | Status: SHIPPED | OUTPATIENT
Start: 2020-10-05 | End: 2020-12-11

## 2020-12-11 RX ORDER — INDOMETHACIN 50 MG/1
CAPSULE ORAL
Qty: 60 CAP | Refills: 0 | Status: SHIPPED | OUTPATIENT
Start: 2020-12-11 | End: 2021-02-15

## 2020-12-11 RX ORDER — RIZATRIPTAN BENZOATE 10 MG/1
TABLET, ORALLY DISINTEGRATING ORAL
Qty: 12 TAB | Refills: 3 | Status: SHIPPED | OUTPATIENT
Start: 2020-12-11 | End: 2022-04-06

## 2021-02-15 RX ORDER — INDOMETHACIN 50 MG/1
CAPSULE ORAL
Qty: 60 CAP | Refills: 0 | Status: SHIPPED | OUTPATIENT
Start: 2021-02-15 | End: 2021-06-06

## 2021-06-06 RX ORDER — INDOMETHACIN 50 MG/1
CAPSULE ORAL
Qty: 60 CAPSULE | Refills: 0 | Status: SHIPPED | OUTPATIENT
Start: 2021-06-06 | End: 2021-07-14

## 2021-07-14 RX ORDER — INDOMETHACIN 50 MG/1
CAPSULE ORAL
Qty: 60 CAPSULE | Refills: 0 | Status: SHIPPED | OUTPATIENT
Start: 2021-07-14 | End: 2021-11-14

## 2021-11-08 RX ORDER — INDOMETHACIN 50 MG/1
CAPSULE ORAL
Qty: 60 CAPSULE | Refills: 0 | OUTPATIENT
Start: 2021-11-08

## 2021-11-14 RX ORDER — INDOMETHACIN 50 MG/1
CAPSULE ORAL
Qty: 60 CAPSULE | Refills: 0 | Status: SHIPPED | OUTPATIENT
Start: 2021-11-14

## 2022-03-18 PROBLEM — M54.41 BILATERAL LOW BACK PAIN WITH BILATERAL SCIATICA: Status: ACTIVE | Noted: 2018-02-05

## 2022-03-18 PROBLEM — M54.42 BILATERAL LOW BACK PAIN WITH BILATERAL SCIATICA: Status: ACTIVE | Noted: 2018-02-05

## 2022-03-19 PROBLEM — M54.2 CERVICAL SPINE PAIN: Status: ACTIVE | Noted: 2018-02-05

## 2022-03-19 PROBLEM — G43.109 MIGRAINE WITH AURA AND WITHOUT STATUS MIGRAINOSUS, NOT INTRACTABLE: Status: ACTIVE | Noted: 2018-02-05

## 2022-04-06 RX ORDER — RIZATRIPTAN BENZOATE 10 MG/1
TABLET, ORALLY DISINTEGRATING ORAL
Qty: 12 TABLET | Refills: 3 | Status: SHIPPED | OUTPATIENT
Start: 2022-04-06

## 2023-05-24 RX ORDER — COLCHICINE 0.6 MG/1
CAPSULE ORAL
COMMUNITY
Start: 2020-04-04

## 2023-05-24 RX ORDER — TRAZODONE HYDROCHLORIDE 100 MG/1
100 TABLET ORAL
COMMUNITY
Start: 2018-02-05

## 2023-05-24 RX ORDER — PREGABALIN 50 MG/1
50 CAPSULE ORAL 2 TIMES DAILY
COMMUNITY
Start: 2018-02-05

## 2023-05-24 RX ORDER — CYCLOBENZAPRINE HCL 5 MG
5 TABLET ORAL
COMMUNITY

## 2023-05-24 RX ORDER — ALLOPURINOL 100 MG/1
100 TABLET ORAL DAILY
COMMUNITY
Start: 2019-01-08

## 2023-05-24 RX ORDER — ONDANSETRON 4 MG/1
4 TABLET, ORALLY DISINTEGRATING ORAL EVERY 4 HOURS PRN
COMMUNITY
Start: 2018-07-25

## 2023-05-24 RX ORDER — RIZATRIPTAN BENZOATE 10 MG/1
TABLET, ORALLY DISINTEGRATING ORAL
COMMUNITY
Start: 2022-04-06

## 2023-05-24 RX ORDER — INDOMETHACIN 50 MG/1
CAPSULE ORAL
COMMUNITY
Start: 2021-11-14

## (undated) DEVICE — SUT PROL 3-0 18IN PS2 BLU --

## (undated) DEVICE — STERILE POLYISOPRENE POWDER-FREE SURGICAL GLOVES: Brand: PROTEXIS

## (undated) DEVICE — SUTURE PLN GUT SZ 4-0 P-3 L18IN ABSRB YELLOWISH TAN L13MM 1644G

## (undated) DEVICE — 3M™ MICROPORE™ TAPE ¸INCH X 10 YARDS, TAN, 24 ROLLS/CARTON, 10 CARTONS/CASE, 1533-0: Brand: 3M™ MICROPORE™

## (undated) DEVICE — MASTISOL ADHESIVE LIQ 2/3ML

## (undated) DEVICE — KENDALL SCD EXPRESS SLEEVES, KNEE LENGTH, MEDIUM: Brand: KENDALL SCD

## (undated) DEVICE — MAGNETIC INSTRUMENT PAD 10" X 16"; MEDIUM; DISPOSABLE: Brand: CARDINAL HEALTH

## (undated) DEVICE — SPLINT 1524055 DOYLE II AIRWAY SET: Brand: DOYLE II ™

## (undated) DEVICE — SUTURE PDS II SZ 4-0 L18IN ABSRB UD P-3 L13MM 3/8 CIR PRIM Z494G

## (undated) DEVICE — SUT PLN 5-0 18IN P3 YEL --

## (undated) DEVICE — BLADE 1882040 5PK INFERIOR TURB 2MM

## (undated) DEVICE — BIPOLAR FORCEPS CORD: Brand: VALLEYLAB

## (undated) DEVICE — SOL IRRIGATION INJ NACL 0.9% 500ML BTL

## (undated) DEVICE — PACK PROCEDURE SURG ENT CUST

## (undated) DEVICE — SOLUTION IV 500ML 0.9% SOD CHL FLX CONT

## (undated) DEVICE — ROCKER SWITCH PENCIL BLADE ELECTRODE, HOLSTER: Brand: EDGE

## (undated) DEVICE — ELECTRODE NDL L2CM TUNGSTEN MICSURG STR IMPROVED CUT AND

## (undated) DEVICE — Z DISCONTINUEDSOLUTION PREP 2OZ 10% POVIDONE IOD SCR CAP BTL

## (undated) DEVICE — SYR IRR BLB 2OZ DISP BLU STRL -- CONVERT TO ITEM 357637

## (undated) DEVICE — APPLICATOR FBR TIP L6IN COT TIP WOOD SHFT SWAB 2000 PER CA

## (undated) DEVICE — OCCLUSIVE GAUZE STRIP,3% BISMUTH TRIBROMOPHENATE IN PETROLATUM BLEND: Brand: XEROFORM

## (undated) DEVICE — INFECTION CONTROL KIT SYS

## (undated) DEVICE — TELFA NON-ADHERENT ABSORBENT DRESSING: Brand: TELFA

## (undated) DEVICE — DEVON™ KNEE AND BODY STRAP 60" X 3" (1.5 M X 7.6 CM): Brand: DEVON

## (undated) DEVICE — SUT PLN 4-0 18IN SC1 DA YEL --